# Patient Record
Sex: FEMALE | Race: WHITE | Employment: OTHER | URBAN - METROPOLITAN AREA
[De-identification: names, ages, dates, MRNs, and addresses within clinical notes are randomized per-mention and may not be internally consistent; named-entity substitution may affect disease eponyms.]

---

## 2017-03-30 PROBLEM — Z78.0 MENOPAUSE: Status: ACTIVE | Noted: 2017-03-30

## 2017-07-05 PROCEDURE — 87184 SC STD DISK METHOD PER PLATE: CPT | Performed by: INTERNAL MEDICINE

## 2017-07-05 PROCEDURE — 87086 URINE CULTURE/COLONY COUNT: CPT | Performed by: INTERNAL MEDICINE

## 2017-07-05 PROCEDURE — 87186 SC STD MICRODIL/AGAR DIL: CPT | Performed by: INTERNAL MEDICINE

## 2017-07-05 PROCEDURE — 87077 CULTURE AEROBIC IDENTIFY: CPT | Performed by: INTERNAL MEDICINE

## 2018-07-19 PROBLEM — E66.9 OBESITY (BMI 35.0-39.9 WITHOUT COMORBIDITY): Status: ACTIVE | Noted: 2018-07-19

## 2018-07-19 PROBLEM — Z95.5 PRESENCE OF DRUG COATED STENT IN LEFT CIRCUMFLEX CORONARY ARTERY: Status: ACTIVE | Noted: 2018-07-19

## 2018-07-19 PROBLEM — R00.2 PALPITATIONS: Status: ACTIVE | Noted: 2018-07-19

## 2018-12-12 PROCEDURE — 87086 URINE CULTURE/COLONY COUNT: CPT | Performed by: INTERNAL MEDICINE

## 2018-12-12 PROCEDURE — 87186 SC STD MICRODIL/AGAR DIL: CPT | Performed by: INTERNAL MEDICINE

## 2018-12-12 PROCEDURE — 87088 URINE BACTERIA CULTURE: CPT | Performed by: INTERNAL MEDICINE

## 2020-02-25 PROBLEM — E66.01 MORBID (SEVERE) OBESITY DUE TO EXCESS CALORIES (HCC): Status: ACTIVE | Noted: 2020-02-25

## 2021-04-13 PROBLEM — K51.80 OTHER ULCERATIVE COLITIS WITHOUT COMPLICATION (HCC): Status: ACTIVE | Noted: 2021-04-13

## 2021-05-04 PROCEDURE — 88305 TISSUE EXAM BY PATHOLOGIST: CPT | Performed by: RADIOLOGY

## 2021-05-04 PROCEDURE — 88342 IMHCHEM/IMCYTCHM 1ST ANTB: CPT | Performed by: RADIOLOGY

## 2021-05-04 PROCEDURE — 88360 TUMOR IMMUNOHISTOCHEM/MANUAL: CPT | Performed by: RADIOLOGY

## 2021-05-12 PROBLEM — Z17.0 MALIGNANT NEOPLASM OF LOWER-OUTER QUADRANT OF LEFT BREAST OF FEMALE, ESTROGEN RECEPTOR POSITIVE (HCC): Status: ACTIVE | Noted: 2021-05-12

## 2021-05-12 PROBLEM — Z17.0 MALIGNANT NEOPLASM OF LOWER-OUTER QUADRANT OF LEFT BREAST OF FEMALE, ESTROGEN RECEPTOR POSITIVE: Status: ACTIVE | Noted: 2021-05-12

## 2021-05-12 PROBLEM — C50.512 MALIGNANT NEOPLASM OF LOWER-OUTER QUADRANT OF LEFT BREAST OF FEMALE, ESTROGEN RECEPTOR POSITIVE (HCC): Status: ACTIVE | Noted: 2021-05-12

## 2021-05-12 PROBLEM — C50.512 MALIGNANT NEOPLASM OF LOWER-OUTER QUADRANT OF LEFT BREAST OF FEMALE, ESTROGEN RECEPTOR POSITIVE: Status: ACTIVE | Noted: 2021-05-12

## 2021-06-01 ENCOUNTER — TELEPHONE (OUTPATIENT)
Dept: HEMATOLOGY/ONCOLOGY | Facility: HOSPITAL | Age: 61
End: 2021-06-01

## 2021-06-04 ENCOUNTER — GENETICS ENCOUNTER (OUTPATIENT)
Dept: GENETICS | Facility: HOSPITAL | Age: 61
End: 2021-06-04
Payer: MEDICARE

## 2021-06-04 ENCOUNTER — OFFICE VISIT (OUTPATIENT)
Dept: SURGERY | Facility: CLINIC | Age: 61
End: 2021-06-04
Payer: MEDICARE

## 2021-06-04 ENCOUNTER — NURSE ONLY (OUTPATIENT)
Dept: HEMATOLOGY/ONCOLOGY | Facility: HOSPITAL | Age: 61
End: 2021-06-04
Payer: MEDICARE

## 2021-06-04 VITALS — BODY MASS INDEX: 42.85 KG/M2 | RESPIRATION RATE: 16 BRPM | HEIGHT: 67 IN | WEIGHT: 273 LBS

## 2021-06-04 DIAGNOSIS — Z80.3 FAMILY HISTORY OF BREAST CANCER: ICD-10-CM

## 2021-06-04 DIAGNOSIS — C50.512 MALIGNANT NEOPLASM OF LOWER-OUTER QUADRANT OF LEFT BREAST OF FEMALE, ESTROGEN RECEPTOR POSITIVE (HCC): ICD-10-CM

## 2021-06-04 DIAGNOSIS — E66.01 MORBID (SEVERE) OBESITY DUE TO EXCESS CALORIES (HCC): Primary | ICD-10-CM

## 2021-06-04 DIAGNOSIS — C50.512 MALIGNANT NEOPLASM OF LOWER-OUTER QUADRANT OF LEFT BREAST OF FEMALE, ESTROGEN RECEPTOR POSITIVE (HCC): Primary | ICD-10-CM

## 2021-06-04 DIAGNOSIS — Z17.0 MALIGNANT NEOPLASM OF LOWER-OUTER QUADRANT OF LEFT BREAST OF FEMALE, ESTROGEN RECEPTOR POSITIVE (HCC): ICD-10-CM

## 2021-06-04 DIAGNOSIS — Z17.0 MALIGNANT NEOPLASM OF LOWER-OUTER QUADRANT OF LEFT BREAST OF FEMALE, ESTROGEN RECEPTOR POSITIVE (HCC): Primary | ICD-10-CM

## 2021-06-04 PROCEDURE — 36415 COLL VENOUS BLD VENIPUNCTURE: CPT

## 2021-06-04 PROCEDURE — 99203 OFFICE O/P NEW LOW 30 MIN: CPT | Performed by: SURGERY

## 2021-06-04 PROCEDURE — 3008F BODY MASS INDEX DOCD: CPT | Performed by: SURGERY

## 2021-06-04 PROCEDURE — 96040 HC GENETIC COUNSELING EA 30 MIN: CPT

## 2021-06-04 NOTE — PROGRESS NOTES
Patient Name: Yeison San  YOB: 1960  Date of Visit: 6/4/2021    Reason for visit: Ms. Aminta Bedoya was seen for the purposes of genetic counseling due to her recent diagnosis of breast cancer at age 59 and a family history of breast cancer from dementia with no cancers, one of her nieces  at 40 due to lung cancer with a h/o smoking. Ms. Valentino's maternal grandfather  at 79 due to cardiac issues with no cancers.      Ms. Valentino's father  at 61 due to heart disease with no can hereditary ovarian cancer. Mutations in BRCA1 increase risks for male breast cancer, prostate cancer, and pancreatic as well. This gene is acts as a tumor suppressor gene.  If at least one BRCA1 gene is functioning normally, an individual will be prevente with 90-95% accuracy in identifying a mutation. Other genes that have been identified to be responsible for breast cancer include VELIA, BRIP1, CHEK2, PALB2, PTEN, RAD50, and TP53.  These other genes account for a smaller percentage of hereditary breast cance would return to those expected for individuals in the general population.   It should be emphasized that absence of a mutation in an at-risk individual would not eliminate their risk for cancer, but simply return them to the risk expected for the general po Juan  and family members will depend the above genetic testing results.       Send to: Laz/Mikel/Jax  Time spent with patient: 40 minutes

## 2021-06-04 NOTE — CONSULTS
New Patient Consultation    This is the first visit for this 59year old female who presents to discuss reconstructive options following surgery for breast cancer. History of Present Illness:    The patient is a 59year old female who presents with a lef significant. • CARDIAC CATHETERIZATION  08/30/2005    [address] [place] [address], Dr. Motta Cornelio: Proximal or mid LM 90% stenosis. Mid LAD at diagonal branch a 40% stenosis. RCA with 20- 30 % lesions. EF 50% range.     • CARPAL TUNNEL RELEASE     • CHOLECYSTECTOMY     • Piper Caldwell swollen glands. Respiratory:  The patient denies shortness of breath, cough, bloody cough, phlegm, asthma, or wheezing. Cardiovascular:    The patient denies chest pain/pressure, palpitations, irregular heartbeat, +high blood pressure, stroke, or leg patient is awake, alert, and oriented. She is well-nourished, well-developed woman who appears her stated age. Her speech patterns and movements are normal. Her affect is appropriate. HEENT: The head is normocephalic. The neck is supple.  The thyroid is reconstruction was reviewed with the patient. We discussed the partial submuscular placement of the tissue expander, use of acellular matrix, and placement of drains.   We discussed that use of acellular dermal matrix in  breast reconstruction is considered discussed including the need for activity limitation, drains, and suture removal. We reviewed the impact of post-mastectomy radiation therapy on flap-based reconstruction (should it be deemed necessary based on the final pathology results) including flap f

## 2021-06-08 NOTE — PROGRESS NOTES
This patient is scheduled to have MRI guided breast biopsy on Thursday, Gracie 10. She is not a candidate for reconstruction. She is scheduled to see me Friday, June 25.   If she wants to see me next week either 16 or 18 please schedule 45-minute appointmen

## 2021-06-10 PROCEDURE — 88344 IMHCHEM/IMCYTCHM EA MLT ANTB: CPT | Performed by: RADIOLOGY

## 2021-06-10 PROCEDURE — 88305 TISSUE EXAM BY PATHOLOGIST: CPT | Performed by: RADIOLOGY

## 2021-06-14 ENCOUNTER — GENETICS ENCOUNTER (OUTPATIENT)
Dept: HEMATOLOGY/ONCOLOGY | Facility: HOSPITAL | Age: 61
End: 2021-06-14

## 2021-06-14 NOTE — PROGRESS NOTES
Patient Name: Jamison Lucero  YOB: 1960    Referring Provider:  Chandni Chua MD          Reason for Referral:  Ms. Beverly Donahue had genetic testing performed on 6/4/2021 because of a diagnosis of breast cancer at age 59 and a family hist No changes in management or testing of family members is recommended based on a VUS result. The limitations of the testing were discussed with Ms. Valentino including the chance that a pathogenic variant in a gene other than those included in this analysis

## 2021-06-24 PROCEDURE — 88305 TISSUE EXAM BY PATHOLOGIST: CPT | Performed by: RADIOLOGY

## 2021-06-25 PROBLEM — D05.01 LOBULAR CARCINOMA IN SITU (LCIS) OF RIGHT BREAST: Status: ACTIVE | Noted: 2021-06-25

## 2021-06-25 NOTE — H&P (VIEW-ONLY)
.   Patient presents with: Follow - Up: Follow up LT breast discussion     HPI:    She Hines is a 59year old  female. The patient presents to the office for new diagnosis of  left breast invasive ductal carcinoma 2 sites.    This was recently diag Category 4: Suspicious. Management Recommendation: Biopsy should be performed in the absence of clinical contraindication. The Department of radiology will coordinate scheduling the biopsy with the patient.     Ultrasound-guided core biopsy left breast ma biopsies:  -Invasive ductal carcinoma. -Grade 1 (Tubules: 1, Nuclei: 1, Mitoses: 1). -Largest focus of tumor measures 5 mm (0.5 cm); present in 20% of submitted tissue.        -Associated fibrosis. -Ductal carcinoma in situ (DCIS).         -N -   CB11                0 Negative             6/25/2021: Patient presents for preop discussion of new diagnosis left breast cancer x2.   Patient had MRI guided right breast biopsy which revealed atypical lobular hyperplasia and lobular carcinoma in situ guided needle core biopsy:  -Fragments of breast tissue with atypical lobular hyperplasia (ALH). -Intraductal papilloma (1.5 mm), microcyst formation and stromal fibrosis.     B.   Right breast, 12 o'clock position, MRI guided needle core biopsy:  -Focal a UT) Oral Cap Take 1 tablet by mouth daily. • Zinc 50 MG Oral Cap Take by mouth. • metFORMIN HCl 500 MG Oral Tab Take 1 tablet (500 mg total) by mouth 2 (two) times daily with meals.  60 tablet 5   • gabapentin 300 MG Oral Cap TAKE  1 CAPSULE THREE T 08/30/2005    [facility]: Dr. Susanna Herron: Successful stenting of the circumflex artery with Rx cypher stent.       Family History   Problem Relation Age of Onset   • Heart Disorder Father    • Heart Disorder Brother    • Breast Cancer Cousin 45        mat cousin dx cyanosis or edema  BREAST EXAM:  Axillary Lymph Nodes: No supraclavicular, infraclavicular or axillary adenopathy present  Breast:  LEFT BREAST: 2cm mass at the 5:00 location with a scar present near skin surface, no other masses present.   sebacous cyst of present the patient will require sentinel lymph node biopsy on the right side as well. I had a lengthydiscussion with the patient about breast masses, breast cancer and the need for further work up.   I have recommended that the patient have an open right minutes.   Roland Cruz MD

## 2021-06-29 ENCOUNTER — LAB ENCOUNTER (OUTPATIENT)
Dept: LAB | Age: 61
End: 2021-06-29
Attending: SURGERY
Payer: MEDICARE

## 2021-06-29 DIAGNOSIS — Z01.818 PREOP TESTING: ICD-10-CM

## 2021-06-30 ENCOUNTER — ANESTHESIA EVENT (OUTPATIENT)
Dept: SURGERY | Facility: HOSPITAL | Age: 61
End: 2021-06-30
Payer: MEDICARE

## 2021-07-01 ENCOUNTER — HOSPITAL ENCOUNTER (OUTPATIENT)
Dept: MAMMOGRAPHY | Facility: HOSPITAL | Age: 61
Discharge: HOME | End: 2021-07-01
Attending: SURGERY
Payer: MEDICARE

## 2021-07-01 ENCOUNTER — APPOINTMENT (OUTPATIENT)
Dept: MAMMOGRAPHY | Facility: HOSPITAL | Age: 61
End: 2021-07-01
Attending: SURGERY
Payer: MEDICARE

## 2021-07-01 ENCOUNTER — HOSPITAL ENCOUNTER (OUTPATIENT)
Facility: HOSPITAL | Age: 61
Setting detail: HOSPITAL OUTPATIENT SURGERY
Discharge: HOME | End: 2021-07-01
Attending: SURGERY | Admitting: SURGERY
Payer: MEDICARE

## 2021-07-01 ENCOUNTER — ANESTHESIA (OUTPATIENT)
Dept: SURGERY | Facility: HOSPITAL | Age: 61
End: 2021-07-01
Payer: MEDICARE

## 2021-07-01 VITALS
HEART RATE: 60 BPM | HEIGHT: 67 IN | DIASTOLIC BLOOD PRESSURE: 62 MMHG | BODY MASS INDEX: 42.38 KG/M2 | RESPIRATION RATE: 14 BRPM | WEIGHT: 270 LBS | TEMPERATURE: 98 F | SYSTOLIC BLOOD PRESSURE: 122 MMHG | OXYGEN SATURATION: 98 %

## 2021-07-01 DIAGNOSIS — Z01.818 PREOP TESTING: Primary | ICD-10-CM

## 2021-07-01 DIAGNOSIS — C50.919 BREAST CANCER (HCC): ICD-10-CM

## 2021-07-01 DIAGNOSIS — C50.512 MALIGNANT NEOPLASM OF LOWER-OUTER QUADRANT OF LEFT BREAST OF FEMALE, ESTROGEN RECEPTOR POSITIVE (HCC): ICD-10-CM

## 2021-07-01 DIAGNOSIS — Z17.0 MALIGNANT NEOPLASM OF LOWER-OUTER QUADRANT OF LEFT BREAST OF FEMALE, ESTROGEN RECEPTOR POSITIVE (HCC): ICD-10-CM

## 2021-07-01 DIAGNOSIS — N63.10 BREAST MASS, RIGHT: ICD-10-CM

## 2021-07-01 DIAGNOSIS — N60.91 ATYPICAL LOBULAR HYPERPLASIA OF RIGHT BREAST: ICD-10-CM

## 2021-07-01 LAB — SARS-COV-2 RNA RESP QL NAA+PROBE: NOT DETECTED

## 2021-07-01 PROCEDURE — 0HBT0ZZ EXCISION OF RIGHT BREAST, OPEN APPROACH: ICD-10-PCS | Performed by: SURGERY

## 2021-07-01 PROCEDURE — 76098 X-RAY EXAM SURGICAL SPECIMEN: CPT | Performed by: SURGERY

## 2021-07-01 PROCEDURE — 19282 PERQ DEVICE BREAST EA IMAG: CPT | Performed by: SURGERY

## 2021-07-01 PROCEDURE — 88307 TISSUE EXAM BY PATHOLOGIST: CPT | Performed by: SURGERY

## 2021-07-01 PROCEDURE — 88360 TUMOR IMMUNOHISTOCHEM/MANUAL: CPT | Performed by: SURGERY

## 2021-07-01 PROCEDURE — 19281 PERQ DEVICE BREAST 1ST IMAG: CPT | Performed by: SURGERY

## 2021-07-01 RX ORDER — MIDAZOLAM HYDROCHLORIDE 1 MG/ML
INJECTION INTRAMUSCULAR; INTRAVENOUS AS NEEDED
Status: DISCONTINUED | OUTPATIENT
Start: 2021-07-01 | End: 2021-07-01 | Stop reason: SURG

## 2021-07-01 RX ORDER — HYDROMORPHONE HYDROCHLORIDE 1 MG/ML
0.4 INJECTION, SOLUTION INTRAMUSCULAR; INTRAVENOUS; SUBCUTANEOUS EVERY 5 MIN PRN
Status: DISCONTINUED | OUTPATIENT
Start: 2021-07-01 | End: 2021-07-01

## 2021-07-01 RX ORDER — ONDANSETRON 2 MG/ML
INJECTION INTRAMUSCULAR; INTRAVENOUS AS NEEDED
Status: DISCONTINUED | OUTPATIENT
Start: 2021-07-01 | End: 2021-07-01 | Stop reason: SURG

## 2021-07-01 RX ORDER — LIDOCAINE HYDROCHLORIDE 10 MG/ML
INJECTION, SOLUTION EPIDURAL; INFILTRATION; INTRACAUDAL; PERINEURAL AS NEEDED
Status: DISCONTINUED | OUTPATIENT
Start: 2021-07-01 | End: 2021-07-01 | Stop reason: SURG

## 2021-07-01 RX ORDER — SODIUM CHLORIDE, SODIUM LACTATE, POTASSIUM CHLORIDE, CALCIUM CHLORIDE 600; 310; 30; 20 MG/100ML; MG/100ML; MG/100ML; MG/100ML
INJECTION, SOLUTION INTRAVENOUS CONTINUOUS
Status: DISCONTINUED | OUTPATIENT
Start: 2021-07-01 | End: 2021-07-01

## 2021-07-01 RX ORDER — HYDROMORPHONE HYDROCHLORIDE 1 MG/ML
0.6 INJECTION, SOLUTION INTRAMUSCULAR; INTRAVENOUS; SUBCUTANEOUS EVERY 5 MIN PRN
Status: DISCONTINUED | OUTPATIENT
Start: 2021-07-01 | End: 2021-07-01

## 2021-07-01 RX ORDER — ONDANSETRON 2 MG/ML
4 INJECTION INTRAMUSCULAR; INTRAVENOUS ONCE AS NEEDED
Status: DISCONTINUED | OUTPATIENT
Start: 2021-07-01 | End: 2021-07-01

## 2021-07-01 RX ORDER — TRAMADOL HYDROCHLORIDE 50 MG/1
50 TABLET ORAL EVERY 6 HOURS PRN
Status: CANCELLED | OUTPATIENT
Start: 2021-07-01

## 2021-07-01 RX ORDER — HYDROCODONE BITARTRATE AND ACETAMINOPHEN 5; 325 MG/1; MG/1
1 TABLET ORAL AS NEEDED
Status: DISCONTINUED | OUTPATIENT
Start: 2021-07-01 | End: 2021-07-01

## 2021-07-01 RX ORDER — MORPHINE SULFATE 10 MG/ML
6 INJECTION, SOLUTION INTRAMUSCULAR; INTRAVENOUS EVERY 10 MIN PRN
Status: DISCONTINUED | OUTPATIENT
Start: 2021-07-01 | End: 2021-07-01

## 2021-07-01 RX ORDER — MORPHINE SULFATE 4 MG/ML
2 INJECTION, SOLUTION INTRAMUSCULAR; INTRAVENOUS EVERY 10 MIN PRN
Status: DISCONTINUED | OUTPATIENT
Start: 2021-07-01 | End: 2021-07-01

## 2021-07-01 RX ORDER — PROCHLORPERAZINE EDISYLATE 5 MG/ML
5 INJECTION INTRAMUSCULAR; INTRAVENOUS EVERY 8 HOURS PRN
Status: CANCELLED | OUTPATIENT
Start: 2021-07-01

## 2021-07-01 RX ORDER — NALOXONE HYDROCHLORIDE 0.4 MG/ML
80 INJECTION, SOLUTION INTRAMUSCULAR; INTRAVENOUS; SUBCUTANEOUS AS NEEDED
Status: DISCONTINUED | OUTPATIENT
Start: 2021-07-01 | End: 2021-07-01

## 2021-07-01 RX ORDER — PROCHLORPERAZINE EDISYLATE 5 MG/ML
5 INJECTION INTRAMUSCULAR; INTRAVENOUS ONCE AS NEEDED
Status: DISCONTINUED | OUTPATIENT
Start: 2021-07-01 | End: 2021-07-01

## 2021-07-01 RX ORDER — ACETAMINOPHEN 500 MG
1000 TABLET ORAL ONCE
Status: COMPLETED | OUTPATIENT
Start: 2021-07-01 | End: 2021-07-01

## 2021-07-01 RX ORDER — BUPIVACAINE HYDROCHLORIDE AND EPINEPHRINE 2.5; 5 MG/ML; UG/ML
INJECTION, SOLUTION INFILTRATION; PERINEURAL AS NEEDED
Status: DISCONTINUED | OUTPATIENT
Start: 2021-07-01 | End: 2021-07-01 | Stop reason: HOSPADM

## 2021-07-01 RX ORDER — HALOPERIDOL 5 MG/ML
0.25 INJECTION INTRAMUSCULAR ONCE AS NEEDED
Status: DISCONTINUED | OUTPATIENT
Start: 2021-07-01 | End: 2021-07-01

## 2021-07-01 RX ORDER — HYDROMORPHONE HYDROCHLORIDE 1 MG/ML
0.2 INJECTION, SOLUTION INTRAMUSCULAR; INTRAVENOUS; SUBCUTANEOUS EVERY 5 MIN PRN
Status: DISCONTINUED | OUTPATIENT
Start: 2021-07-01 | End: 2021-07-01

## 2021-07-01 RX ORDER — HYDROCODONE BITARTRATE AND ACETAMINOPHEN 5; 325 MG/1; MG/1
2 TABLET ORAL AS NEEDED
Status: DISCONTINUED | OUTPATIENT
Start: 2021-07-01 | End: 2021-07-01

## 2021-07-01 RX ORDER — ONDANSETRON 2 MG/ML
4 INJECTION INTRAMUSCULAR; INTRAVENOUS EVERY 6 HOURS PRN
Status: CANCELLED | OUTPATIENT
Start: 2021-07-01

## 2021-07-01 RX ORDER — FAMOTIDINE 20 MG/1
20 TABLET ORAL ONCE
Status: COMPLETED | OUTPATIENT
Start: 2021-07-01 | End: 2021-07-01

## 2021-07-01 RX ORDER — METOPROLOL TARTRATE 5 MG/5ML
2.5 INJECTION INTRAVENOUS ONCE
Status: DISCONTINUED | OUTPATIENT
Start: 2021-07-01 | End: 2021-07-01

## 2021-07-01 RX ORDER — DEXAMETHASONE SODIUM PHOSPHATE 4 MG/ML
VIAL (ML) INJECTION AS NEEDED
Status: DISCONTINUED | OUTPATIENT
Start: 2021-07-01 | End: 2021-07-01 | Stop reason: SURG

## 2021-07-01 RX ORDER — MORPHINE SULFATE 4 MG/ML
4 INJECTION, SOLUTION INTRAMUSCULAR; INTRAVENOUS EVERY 10 MIN PRN
Status: DISCONTINUED | OUTPATIENT
Start: 2021-07-01 | End: 2021-07-01

## 2021-07-01 RX ORDER — METOCLOPRAMIDE 10 MG/1
10 TABLET ORAL ONCE
Status: COMPLETED | OUTPATIENT
Start: 2021-07-01 | End: 2021-07-01

## 2021-07-01 RX ADMIN — SODIUM CHLORIDE, SODIUM LACTATE, POTASSIUM CHLORIDE, CALCIUM CHLORIDE: 600; 310; 30; 20 INJECTION, SOLUTION INTRAVENOUS at 12:26:00

## 2021-07-01 RX ADMIN — LIDOCAINE HYDROCHLORIDE 250 MG: 10 INJECTION, SOLUTION EPIDURAL; INFILTRATION; INTRACAUDAL; PERINEURAL at 12:30:00

## 2021-07-01 RX ADMIN — MIDAZOLAM HYDROCHLORIDE 2 MG: 1 INJECTION INTRAMUSCULAR; INTRAVENOUS at 12:30:00

## 2021-07-01 RX ADMIN — ONDANSETRON 4 MG: 2 INJECTION INTRAMUSCULAR; INTRAVENOUS at 13:20:00

## 2021-07-01 RX ADMIN — DEXAMETHASONE SODIUM PHOSPHATE 4 MG: 4 MG/ML VIAL (ML) INJECTION at 13:16:00

## 2021-07-01 RX ADMIN — SODIUM CHLORIDE, SODIUM LACTATE, POTASSIUM CHLORIDE, CALCIUM CHLORIDE: 600; 310; 30; 20 INJECTION, SOLUTION INTRAVENOUS at 13:50:00

## 2021-07-01 NOTE — ANESTHESIA PREPROCEDURE EVALUATION
Anesthesia PreOp Note    HPI:     Yonny Valadez is a 59year old female who presents for preoperative consultation requested by: Iker Luevano MD    Date of Surgery: 7/1/2021    Procedure(s):  Right breast lumpectomy with wire localization at 10 o'c Date Noted: 09/25/2012        Past Medical History:   Diagnosis Date   • Anxiety state    • Atherosclerosis of coronary artery    • Back problem    • Cancer [facility])     right breast cancer   • Colitis    • Esophageal reflux    • Essential hypertension Tab, Take 1 tablet (1 mg total) by mouth 2 (two) times daily as needed. , Disp: 60 tablet, Rfl: 1, 6/30/2021 at 2100  Verapamil HCl  MG Oral Tab CR, Take 1 tablet (240 mg total) by mouth daily. , Disp: 90 tablet, Rfl: 0, 7/1/2021 at 0700  Triamterene-H Highest education level: Not on file    Occupational History      Occupation: retired phelbotomy    Tobacco Use      Smoking status: Former Smoker        Packs/day: 1.00        Years: 12.00        Pack years: 12        Types: Cigarettes        Quit date: 2 05/24/2021    MCH 28.1 05/24/2021    MCHC 32.1 05/24/2021    RDW 13.9 05/24/2021     05/24/2021     Lab Results   Component Value Date     05/24/2021    K 3.57 05/24/2021     05/24/2021    CO2 24.8 05/24/2021    BUN 8.0 05/24/2021    CRE

## 2021-07-01 NOTE — PROCEDURES
[facility] HOSP - [facility]  Procedure Note    Braxton Bermudez Patient Status:  Outpatient    3/18/1960 MRN [id number]   [facility] Bryson Nath MD   Hosp Day # 0 PCP Boston Ash MD     Procedure: [facility] DISPLAY PLAN FREE TEXT

## 2021-07-01 NOTE — BRIEF OP NOTE
Pre-Operative Diagnosis: Atypical lobular hyperplasia of right breast [N60.91]  Malignant neoplasm of lower-outer quadrant of left breast of female, estrogen receptor positive ([facility] [address] [C50.512, Z17.0]     Post-Operative Diagnosis: the same     Procedure Perf

## 2021-07-01 NOTE — ANESTHESIA POSTPROCEDURE EVALUATION
Patient: Sim Floyd    Procedure Summary     Date: 07/01/21 Room / Location: [address] [place] [address] OR 07 / [address] [place] [address] OR    Anesthesia Start: 1226 Anesthesia Stop:     Procedures:       Right breast lumpectomy with wire localization at 6 o'clock position, MRI guided

## 2021-07-01 NOTE — INTERVAL H&P NOTE
Pre-op Diagnosis: Atypical lobular hyperplasia of right breast [N60.91]  Malignant neoplasm of lower-outer quadrant of left breast of female, estrogen receptor positive ([facility] [address] [C50.512, Z17.0]    The above referenced H&P was reviewed by Jose Gamez

## 2021-07-01 NOTE — INTERVAL H&P NOTE
Pre-op Diagnosis: Atypical lobular hyperplasia of right breast [N60.91]  Malignant neoplasm of lower-outer quadrant of left breast of female, estrogen receptor positive ([facility] [address] [C50.512, Z17.0]    The above referenced H&P was reviewed by Andrés Starkey

## 2021-07-01 NOTE — ANESTHESIA PROCEDURE NOTES
Airway  Date/Time: 7/1/2021 12:33 PM  Urgency: Elective      General Information and Staff    Patient location during procedure: OR  Anesthesiologist: Camacho Rodriguez MD  Performed: anesthesiologist     Indications and Patient Condition  Indications fo

## 2021-07-01 NOTE — OPERATIVE REPORT
[facility] POST ANESTHESIA CARE UNIT OPERATIVE REPORT:     PATIENT NAME: Shelia Soares  : 3/18/1960   MRN: [id number]  SITE:   \" , [facility] 67:   2021    PREOPERATIVE DIAGNOSIS: Right breast Right breast lobu present, inability to completely excise the tumor, wound infection, seroma formation, as well as risks with anesthesia including myocardial infarction, respiratory failure, renal failure, pulmonary embolus, DVT and even death were all discussed in detail w to the recovery room extubated and in stable condition. All counts were correct at the end of the case. The role of my assistant was critical in the fact that they acted in retraction, exposure, the breast and axilla, as well as aiding in wound closure.

## 2021-07-01 NOTE — IMAGING NOTE
5383 Pt  to ultrasound [facility] department scouts completed by Counts include 234 beds at the [facility] mammography technologist     2344 Hx taken procedure explained questions answered. Order verified and initialed by all staff members .      0830 Consent signed ON FLOOR and verified

## 2021-07-16 PROBLEM — C50.111 MALIGNANT NEOPLASM OF CENTRAL PORTION OF BOTH BREASTS IN FEMALE, ESTROGEN RECEPTOR POSITIVE (HCC): Status: ACTIVE | Noted: 2021-07-16

## 2021-07-16 PROBLEM — Z17.0 MALIGNANT NEOPLASM OF CENTRAL PORTION OF BOTH BREASTS IN FEMALE, ESTROGEN RECEPTOR POSITIVE (HCC): Status: ACTIVE | Noted: 2021-07-16

## 2021-07-16 PROBLEM — C50.112 MALIGNANT NEOPLASM OF CENTRAL PORTION OF BOTH BREASTS IN FEMALE, ESTROGEN RECEPTOR POSITIVE (HCC): Status: ACTIVE | Noted: 2021-07-16

## 2021-07-16 NOTE — PAT NURSING NOTE
Spoke with Cara Minaya in Dr. Karen Retana office, IV placement should be in the foot on day of surgery, per Dr. Bhavesh Conrad.

## 2021-07-16 NOTE — H&P (VIEW-ONLY)
No chief complaint on file. HPI:    Krzysztof Arroyo is a 59year old  female. The patient presents to the office for new diagnosis of  left breast invasive ductal carcinoma 2 sites. This was recently diagnosed.   Previous breast procedures include Recommendation: Biopsy should be performed in the absence of clinical contraindication. The Department of radiology will coordinate scheduling the biopsy with the patient.     Ultrasound-guided core biopsy left breast mass x2 and ultrasound-guided biopsy l -Grade 1 (Tubules: 1, Nuclei: 1, Mitoses: 1). -Largest focus of tumor measures 5 mm (0.5 cm); present in 20% of submitted tissue.        -Associated fibrosis. -Ductal carcinoma in situ (DCIS). -Nuclear grade 1, cribriform type.     C.   Left 6/25/2021: Patient presents for preop discussion of new diagnosis left breast cancer x2.   Patient had MRI guided right breast biopsy which revealed atypical lobular hyperplasia and lobular carcinoma in situ  Patient is scheduled to undergo right br of breast tissue with atypical lobular hyperplasia (ALH). -Intraductal papilloma (1.5 mm), microcyst formation and stromal fibrosis.     B. Right breast, 12 o'clock position, MRI guided needle core biopsy:  -Focal atypical ductal hyperplasia (ADH).   -Marine Cruz 1mm from the closest inked anterior margin. · Extensive ductal carcinoma in situ, low-grade, cribriform and micropapillary types also involving intraductal papilloma.   · DCIS shows extension to inked lateral margin and seen at a distance of <1 mm from the needed. 60 tablet 1   • Verapamil HCl  MG Oral Tab CR Take 1 tablet (240 mg total) by mouth daily. 90 tablet 0   • Triamterene-HCTZ 37.5-25 MG Oral Tab Take 1 tablet by mouth daily.  90 tablet 0   • METOPROLOL TARTRATE 25 MG Oral Tab TAKE 1 TABLET(25 stenosis. Mid LAD at diagonal branch a 40% stenosis. RCA with 20- 30 % lesions. EF 50% range.     • CARPAL TUNNEL RELEASE     • CATH DRUG ELUTING STENT     • CHOLECYSTECTOMY     • COLONOSCOPY     • EXCISIONAL BIOPSY LEFT  2010    unsure of exact path or manuel excessive thirst or urination; denies unexpected wt gain or wt loss  ALLERGY/IMM.: denies food or seasonal allergies    PHYSICAL EXAM:   General Examination:   NECK/THYROID: neck supple, full range of motion, no cervical lymphadenopathy.    LYMPH NODES: no Mikel who does not feel the patient is a candidate for immediate reconstruction due to her body habitus body weight as well as multiple medical comorbidities. I also discussed the MRI results as well as the right breast MRI guided biopsy in detail.   Zulma localization x2 under general anesthesia at [facility] on Thursday, July 1, 2021.   I have also tentatively scheduled the patient to undergo a left breast total mastectomy with left axillary sentinel lymph node biopsy and right breast total mastectom sentinel lymph node biopsy with frozen section possible axillary lymph node dissection under general anesthetic at [facility] on Thursday, July 22, 2021.   Total time spent in direct patient contact and decision-making  And coordinating the patient's

## 2021-07-20 ENCOUNTER — LAB ENCOUNTER (OUTPATIENT)
Dept: LAB | Age: 61
End: 2021-07-20
Attending: SURGERY
Payer: MEDICARE

## 2021-07-20 DIAGNOSIS — R30.0 DYSURIA: ICD-10-CM

## 2021-07-20 DIAGNOSIS — Z01.818 PRE-OP TESTING: ICD-10-CM

## 2021-07-20 LAB — SP GR UR REFRACTOMETRY: 1.01 (ref 1–1.03)

## 2021-07-20 PROCEDURE — 81001 URINALYSIS AUTO W/SCOPE: CPT

## 2021-07-21 LAB — SARS-COV-2 RNA RESP QL NAA+PROBE: NOT DETECTED

## 2021-07-22 ENCOUNTER — ANESTHESIA (OUTPATIENT)
Dept: SURGERY | Facility: HOSPITAL | Age: 61
End: 2021-07-22
Payer: MEDICARE

## 2021-07-22 ENCOUNTER — ANESTHESIA EVENT (OUTPATIENT)
Dept: SURGERY | Facility: HOSPITAL | Age: 61
End: 2021-07-22
Payer: MEDICARE

## 2021-07-22 ENCOUNTER — HOSPITAL ENCOUNTER (OUTPATIENT)
Facility: HOSPITAL | Age: 61
LOS: 1 days | Discharge: HOME | End: 2021-07-23
Attending: SURGERY | Admitting: HOSPITALIST
Payer: MEDICARE

## 2021-07-22 ENCOUNTER — HOSPITAL ENCOUNTER (OUTPATIENT)
Dept: NUCLEAR MEDICINE | Facility: HOSPITAL | Age: 61
Discharge: HOME | End: 2021-07-22
Attending: SURGERY
Payer: MEDICARE

## 2021-07-22 DIAGNOSIS — C50.919 BREAST CANCER (HCC): ICD-10-CM

## 2021-07-22 DIAGNOSIS — Z01.818 PRE-OP TESTING: Primary | ICD-10-CM

## 2021-07-22 DIAGNOSIS — C50.912 INVASIVE DUCTAL CARCINOMA OF BREAST, FEMALE, LEFT (HCC): ICD-10-CM

## 2021-07-22 PROBLEM — C50.911 BILATERAL BREAST CANCER (HCC): Status: ACTIVE | Noted: 2021-07-22

## 2021-07-22 LAB
BILIRUB UR QL: NEGATIVE
GLUCOSE UR-MCNC: NEGATIVE MG/DL
KETONES UR-MCNC: NEGATIVE MG/DL
PH UR: 7 [PH] (ref 5–8)
PROT UR-MCNC: 100 MG/DL
RBC #/AREA URNS AUTO: >10 /HPF
SP GR UR STRIP: 1.01 (ref 1–1.03)
UROBILINOGEN UR STRIP-ACNC: 4
WBC #/AREA URNS AUTO: >50 /HPF
WBC CLUMPS UR QL AUTO: PRESENT /HPF

## 2021-07-22 PROCEDURE — 87086 URINE CULTURE/COLONY COUNT: CPT | Performed by: SURGERY

## 2021-07-22 PROCEDURE — 0HTT0ZZ RESECTION OF RIGHT BREAST, OPEN APPROACH: ICD-10-PCS | Performed by: SURGERY

## 2021-07-22 PROCEDURE — 88307 TISSUE EXAM BY PATHOLOGIST: CPT | Performed by: SURGERY

## 2021-07-22 PROCEDURE — 0HTU0ZZ RESECTION OF LEFT BREAST, OPEN APPROACH: ICD-10-PCS | Performed by: SURGERY

## 2021-07-22 PROCEDURE — 88331 PATH CONSLTJ SURG 1 BLK 1SPC: CPT | Performed by: SURGERY

## 2021-07-22 PROCEDURE — 78195 LYMPH SYSTEM IMAGING: CPT | Performed by: SURGERY

## 2021-07-22 PROCEDURE — 88333 PATH CONSLTJ SURG CYTO XM 1: CPT | Performed by: SURGERY

## 2021-07-22 PROCEDURE — 88309 TISSUE EXAM BY PATHOLOGIST: CPT | Performed by: SURGERY

## 2021-07-22 PROCEDURE — 88300 SURGICAL PATH GROSS: CPT | Performed by: SURGERY

## 2021-07-22 PROCEDURE — 3E0W3KZ INTRODUCTION OF OTHER DIAGNOSTIC SUBSTANCE INTO LYMPHATICS, PERCUTANEOUS APPROACH: ICD-10-PCS | Performed by: SURGERY

## 2021-07-22 PROCEDURE — 88334 PATH CONSLTJ SURG CYTO XM EA: CPT | Performed by: SURGERY

## 2021-07-22 PROCEDURE — 88342 IMHCHEM/IMCYTCHM 1ST ANTB: CPT | Performed by: SURGERY

## 2021-07-22 PROCEDURE — 07B50ZX EXCISION OF RIGHT AXILLARY LYMPHATIC, OPEN APPROACH, DIAGNOSTIC: ICD-10-PCS | Performed by: SURGERY

## 2021-07-22 PROCEDURE — 88332 PATH CONSLTJ SURG EA ADD BLK: CPT | Performed by: SURGERY

## 2021-07-22 PROCEDURE — 07B60ZX EXCISION OF LEFT AXILLARY LYMPHATIC, OPEN APPROACH, DIAGNOSTIC: ICD-10-PCS | Performed by: SURGERY

## 2021-07-22 PROCEDURE — 81001 URINALYSIS AUTO W/SCOPE: CPT | Performed by: SURGERY

## 2021-07-22 PROCEDURE — 88360 TUMOR IMMUNOHISTOCHEM/MANUAL: CPT | Performed by: SURGERY

## 2021-07-22 RX ORDER — PHENYLEPHRINE HCL 10 MG/ML
VIAL (ML) INJECTION AS NEEDED
Status: DISCONTINUED | OUTPATIENT
Start: 2021-07-22 | End: 2021-07-22 | Stop reason: SURG

## 2021-07-22 RX ORDER — ONDANSETRON 2 MG/ML
INJECTION INTRAMUSCULAR; INTRAVENOUS AS NEEDED
Status: DISCONTINUED | OUTPATIENT
Start: 2021-07-22 | End: 2021-07-22 | Stop reason: SURG

## 2021-07-22 RX ORDER — MIDAZOLAM HYDROCHLORIDE 1 MG/ML
INJECTION INTRAMUSCULAR; INTRAVENOUS AS NEEDED
Status: DISCONTINUED | OUTPATIENT
Start: 2021-07-22 | End: 2021-07-22 | Stop reason: SURG

## 2021-07-22 RX ORDER — PROCHLORPERAZINE EDISYLATE 5 MG/ML
5 INJECTION INTRAMUSCULAR; INTRAVENOUS ONCE AS NEEDED
Status: DISCONTINUED | OUTPATIENT
Start: 2021-07-22 | End: 2021-07-22 | Stop reason: HOSPADM

## 2021-07-22 RX ORDER — HYDROMORPHONE HYDROCHLORIDE 1 MG/ML
0.4 INJECTION, SOLUTION INTRAMUSCULAR; INTRAVENOUS; SUBCUTANEOUS EVERY 5 MIN PRN
Status: DISCONTINUED | OUTPATIENT
Start: 2021-07-22 | End: 2021-07-22 | Stop reason: HOSPADM

## 2021-07-22 RX ORDER — POLYETHYLENE GLYCOL 3350 17 G/17G
17 POWDER, FOR SOLUTION ORAL DAILY PRN
Status: DISCONTINUED | OUTPATIENT
Start: 2021-07-22 | End: 2021-07-23

## 2021-07-22 RX ORDER — HYDROMORPHONE HYDROCHLORIDE 1 MG/ML
0.2 INJECTION, SOLUTION INTRAMUSCULAR; INTRAVENOUS; SUBCUTANEOUS EVERY 5 MIN PRN
Status: DISCONTINUED | OUTPATIENT
Start: 2021-07-22 | End: 2021-07-22 | Stop reason: HOSPADM

## 2021-07-22 RX ORDER — ALPRAZOLAM 0.25 MG/1
1 TABLET ORAL 2 TIMES DAILY PRN
Status: DISCONTINUED | OUTPATIENT
Start: 2021-07-22 | End: 2021-07-23

## 2021-07-22 RX ORDER — NALOXONE HYDROCHLORIDE 0.4 MG/ML
80 INJECTION, SOLUTION INTRAMUSCULAR; INTRAVENOUS; SUBCUTANEOUS AS NEEDED
Status: DISCONTINUED | OUTPATIENT
Start: 2021-07-22 | End: 2021-07-22 | Stop reason: HOSPADM

## 2021-07-22 RX ORDER — DEXAMETHASONE SODIUM PHOSPHATE 4 MG/ML
VIAL (ML) INJECTION AS NEEDED
Status: DISCONTINUED | OUTPATIENT
Start: 2021-07-22 | End: 2021-07-22 | Stop reason: SURG

## 2021-07-22 RX ORDER — HYDROMORPHONE HYDROCHLORIDE 1 MG/ML
0.6 INJECTION, SOLUTION INTRAMUSCULAR; INTRAVENOUS; SUBCUTANEOUS EVERY 5 MIN PRN
Status: DISCONTINUED | OUTPATIENT
Start: 2021-07-22 | End: 2021-07-22 | Stop reason: HOSPADM

## 2021-07-22 RX ORDER — ONDANSETRON 2 MG/ML
8 INJECTION INTRAMUSCULAR; INTRAVENOUS EVERY 6 HOURS PRN
Status: DISCONTINUED | OUTPATIENT
Start: 2021-07-22 | End: 2021-07-23

## 2021-07-22 RX ORDER — METOPROLOL TARTRATE 5 MG/5ML
2.5 INJECTION INTRAVENOUS ONCE
Status: DISCONTINUED | OUTPATIENT
Start: 2021-07-22 | End: 2021-07-22 | Stop reason: HOSPADM

## 2021-07-22 RX ORDER — FAMOTIDINE 20 MG/1
20 TABLET ORAL ONCE
Status: COMPLETED | OUTPATIENT
Start: 2021-07-22 | End: 2021-07-22

## 2021-07-22 RX ORDER — HYDROCODONE BITARTRATE AND ACETAMINOPHEN 5; 325 MG/1; MG/1
2 TABLET ORAL AS NEEDED
Status: DISCONTINUED | OUTPATIENT
Start: 2021-07-22 | End: 2021-07-22 | Stop reason: HOSPADM

## 2021-07-22 RX ORDER — CIPROFLOXACIN 2 MG/ML
400 INJECTION, SOLUTION INTRAVENOUS ONCE
Status: COMPLETED | OUTPATIENT
Start: 2021-07-22 | End: 2021-07-22

## 2021-07-22 RX ORDER — METOCLOPRAMIDE 10 MG/1
10 TABLET ORAL ONCE
Status: COMPLETED | OUTPATIENT
Start: 2021-07-22 | End: 2021-07-22

## 2021-07-22 RX ORDER — BISACODYL 10 MG
10 SUPPOSITORY, RECTAL RECTAL
Status: DISCONTINUED | OUTPATIENT
Start: 2021-07-22 | End: 2021-07-23

## 2021-07-22 RX ORDER — ONDANSETRON 2 MG/ML
4 INJECTION INTRAMUSCULAR; INTRAVENOUS ONCE AS NEEDED
Status: DISCONTINUED | OUTPATIENT
Start: 2021-07-22 | End: 2021-07-22 | Stop reason: HOSPADM

## 2021-07-22 RX ORDER — DOCUSATE SODIUM 100 MG/1
100 CAPSULE, LIQUID FILLED ORAL 2 TIMES DAILY
Status: DISCONTINUED | OUTPATIENT
Start: 2021-07-22 | End: 2021-07-23

## 2021-07-22 RX ORDER — ROSUVASTATIN CALCIUM 10 MG/1
10 TABLET, COATED ORAL DAILY
Status: DISCONTINUED | OUTPATIENT
Start: 2021-07-23 | End: 2021-07-23

## 2021-07-22 RX ORDER — MORPHINE SULFATE 4 MG/ML
4 INJECTION, SOLUTION INTRAMUSCULAR; INTRAVENOUS EVERY 10 MIN PRN
Status: DISCONTINUED | OUTPATIENT
Start: 2021-07-22 | End: 2021-07-22 | Stop reason: HOSPADM

## 2021-07-22 RX ORDER — CEFAZOLIN SODIUM/WATER 2 G/20 ML
2 SYRINGE (ML) INTRAVENOUS ONCE
Status: COMPLETED | OUTPATIENT
Start: 2021-07-22 | End: 2021-07-22

## 2021-07-22 RX ORDER — DEXTROSE, SODIUM CHLORIDE, AND POTASSIUM CHLORIDE 5; .45; .15 G/100ML; G/100ML; G/100ML
INJECTION INTRAVENOUS CONTINUOUS
Status: DISCONTINUED | OUTPATIENT
Start: 2021-07-22 | End: 2021-07-23

## 2021-07-22 RX ORDER — SODIUM CHLORIDE, SODIUM LACTATE, POTASSIUM CHLORIDE, CALCIUM CHLORIDE 600; 310; 30; 20 MG/100ML; MG/100ML; MG/100ML; MG/100ML
INJECTION, SOLUTION INTRAVENOUS CONTINUOUS
Status: DISCONTINUED | OUTPATIENT
Start: 2021-07-22 | End: 2021-07-23

## 2021-07-22 RX ORDER — TRAMADOL HYDROCHLORIDE 50 MG/1
50 TABLET ORAL EVERY 6 HOURS PRN
Status: DISCONTINUED | OUTPATIENT
Start: 2021-07-22 | End: 2021-07-23

## 2021-07-22 RX ORDER — SODIUM CHLORIDE, SODIUM LACTATE, POTASSIUM CHLORIDE, CALCIUM CHLORIDE 600; 310; 30; 20 MG/100ML; MG/100ML; MG/100ML; MG/100ML
INJECTION, SOLUTION INTRAVENOUS CONTINUOUS
Status: DISCONTINUED | OUTPATIENT
Start: 2021-07-22 | End: 2021-07-22 | Stop reason: HOSPADM

## 2021-07-22 RX ORDER — HYDROCODONE BITARTRATE AND ACETAMINOPHEN 5; 325 MG/1; MG/1
1 TABLET ORAL AS NEEDED
Status: DISCONTINUED | OUTPATIENT
Start: 2021-07-22 | End: 2021-07-22 | Stop reason: HOSPADM

## 2021-07-22 RX ORDER — MORPHINE SULFATE 10 MG/ML
6 INJECTION, SOLUTION INTRAMUSCULAR; INTRAVENOUS EVERY 10 MIN PRN
Status: DISCONTINUED | OUTPATIENT
Start: 2021-07-22 | End: 2021-07-22 | Stop reason: HOSPADM

## 2021-07-22 RX ORDER — ACETAMINOPHEN 500 MG
1000 TABLET ORAL ONCE
Status: COMPLETED | OUTPATIENT
Start: 2021-07-22 | End: 2021-07-22

## 2021-07-22 RX ORDER — SODIUM PHOSPHATE, DIBASIC AND SODIUM PHOSPHATE, MONOBASIC 7; 19 G/133ML; G/133ML
1 ENEMA RECTAL ONCE AS NEEDED
Status: DISCONTINUED | OUTPATIENT
Start: 2021-07-22 | End: 2021-07-23

## 2021-07-22 RX ORDER — MORPHINE SULFATE 4 MG/ML
2 INJECTION, SOLUTION INTRAMUSCULAR; INTRAVENOUS EVERY 10 MIN PRN
Status: DISCONTINUED | OUTPATIENT
Start: 2021-07-22 | End: 2021-07-22 | Stop reason: HOSPADM

## 2021-07-22 RX ORDER — ACETAMINOPHEN 500 MG
1000 TABLET ORAL EVERY 8 HOURS SCHEDULED
Status: DISCONTINUED | OUTPATIENT
Start: 2021-07-22 | End: 2021-07-23

## 2021-07-22 RX ORDER — METOCLOPRAMIDE HYDROCHLORIDE 5 MG/ML
10 INJECTION INTRAMUSCULAR; INTRAVENOUS EVERY 6 HOURS PRN
Status: DISCONTINUED | OUTPATIENT
Start: 2021-07-22 | End: 2021-07-23

## 2021-07-22 RX ORDER — EPHEDRINE SULFATE 50 MG/ML
INJECTION, SOLUTION INTRAVENOUS AS NEEDED
Status: DISCONTINUED | OUTPATIENT
Start: 2021-07-22 | End: 2021-07-22 | Stop reason: SURG

## 2021-07-22 RX ADMIN — SODIUM CHLORIDE, SODIUM LACTATE, POTASSIUM CHLORIDE, CALCIUM CHLORIDE: 600; 310; 30; 20 INJECTION, SOLUTION INTRAVENOUS at 11:02:00

## 2021-07-22 RX ADMIN — DEXAMETHASONE SODIUM PHOSPHATE 4 MG: 4 MG/ML VIAL (ML) INJECTION at 11:28:00

## 2021-07-22 RX ADMIN — PHENYLEPHRINE HCL 80 MCG: 10 MG/ML VIAL (ML) INJECTION at 12:47:00

## 2021-07-22 RX ADMIN — ONDANSETRON 4 MG: 2 INJECTION INTRAMUSCULAR; INTRAVENOUS at 11:28:00

## 2021-07-22 RX ADMIN — PHENYLEPHRINE HCL 80 MCG: 10 MG/ML VIAL (ML) INJECTION at 12:59:00

## 2021-07-22 RX ADMIN — CEFAZOLIN SODIUM/WATER 1 G: 2 G/20 ML SYRINGE (ML) INTRAVENOUS at 12:09:00

## 2021-07-22 RX ADMIN — PHENYLEPHRINE HCL 80 MCG: 10 MG/ML VIAL (ML) INJECTION at 13:48:00

## 2021-07-22 RX ADMIN — PHENYLEPHRINE HCL 80 MCG: 10 MG/ML VIAL (ML) INJECTION at 12:25:00

## 2021-07-22 RX ADMIN — SODIUM CHLORIDE, SODIUM LACTATE, POTASSIUM CHLORIDE, CALCIUM CHLORIDE: 600; 310; 30; 20 INJECTION, SOLUTION INTRAVENOUS at 12:48:00

## 2021-07-22 RX ADMIN — EPHEDRINE SULFATE 5 MG: 50 INJECTION, SOLUTION INTRAVENOUS at 11:35:00

## 2021-07-22 RX ADMIN — EPHEDRINE SULFATE 10 MG: 50 INJECTION, SOLUTION INTRAVENOUS at 11:49:00

## 2021-07-22 RX ADMIN — PHENYLEPHRINE HCL 80 MCG: 10 MG/ML VIAL (ML) INJECTION at 12:07:00

## 2021-07-22 RX ADMIN — PHENYLEPHRINE HCL 80 MCG: 10 MG/ML VIAL (ML) INJECTION at 13:20:00

## 2021-07-22 RX ADMIN — MIDAZOLAM HYDROCHLORIDE 2 MG: 1 INJECTION INTRAMUSCULAR; INTRAVENOUS at 11:03:00

## 2021-07-22 RX ADMIN — EPHEDRINE SULFATE 5 MG: 50 INJECTION, SOLUTION INTRAVENOUS at 11:40:00

## 2021-07-22 RX ADMIN — EPHEDRINE SULFATE 5 MG: 50 INJECTION, SOLUTION INTRAVENOUS at 11:38:00

## 2021-07-22 RX ADMIN — PHENYLEPHRINE HCL 40 MCG: 10 MG/ML VIAL (ML) INJECTION at 11:53:00

## 2021-07-22 RX ADMIN — CEFAZOLIN SODIUM/WATER 3 G: 2 G/20 ML SYRINGE (ML) INTRAVENOUS at 15:10:00

## 2021-07-22 RX ADMIN — PHENYLEPHRINE HCL 40 MCG: 10 MG/ML VIAL (ML) INJECTION at 11:56:00

## 2021-07-22 RX ADMIN — EPHEDRINE SULFATE 10 MG: 50 INJECTION, SOLUTION INTRAVENOUS at 11:46:00

## 2021-07-22 RX ADMIN — PHENYLEPHRINE HCL 80 MCG: 10 MG/ML VIAL (ML) INJECTION at 13:38:00

## 2021-07-22 RX ADMIN — EPHEDRINE SULFATE 5 MG: 50 INJECTION, SOLUTION INTRAVENOUS at 11:32:00

## 2021-07-22 RX ADMIN — CIPROFLOXACIN 400 MG: 2 INJECTION, SOLUTION INTRAVENOUS at 11:22:00

## 2021-07-22 RX ADMIN — CEFAZOLIN SODIUM/WATER 2 G: 2 G/20 ML SYRINGE (ML) INTRAVENOUS at 11:11:00

## 2021-07-22 RX ADMIN — EPHEDRINE SULFATE 10 MG: 50 INJECTION, SOLUTION INTRAVENOUS at 11:43:00

## 2021-07-22 RX ADMIN — PHENYLEPHRINE HCL 80 MCG: 10 MG/ML VIAL (ML) INJECTION at 12:45:00

## 2021-07-22 NOTE — BRIEF OP NOTE
Pre-Operative Diagnosis: Bilateral invasive ductal carcinoma   Preoperative clinical stage I    Post-Operative Diagnosis: BILATERAL BREAST CANCER   With axillary lymph node metastasis  Postoperative clinical stage III     Procedure Performed:   left modifi

## 2021-07-22 NOTE — ANESTHESIA POSTPROCEDURE EVALUATION
Patient: Coreen Daniels    Procedure Summary     Date: 07/22/21 Room / Location: [facility] OR  / [facility] MAIN OR    Anesthesia Start: 2267 Anesthesia Stop:     Procedure: left modified radical mastectomy with sentinal lymph node biopsy, LEFT AXILLARY DISSECT

## 2021-07-22 NOTE — INTERVAL H&P NOTE
Pre-op Diagnosis: Invasive ductal carcinoma of breast, female, left ([facility] [address] [C50.912]    The above referenced H&P was reviewed by Martha Espinosa MD on 7/22/2021, the patient was examined and no significant changes have occurred in the patient's condition

## 2021-07-22 NOTE — ANESTHESIA PROCEDURE NOTES
Airway  Date/Time: 7/22/2021 11:15 AM  Urgency: elective    Airway not difficult    General Information and Staff    Patient location during procedure: OR  Anesthesiologist: Corry Gaona MD  Resident/CRNA: Sha Grimes CRNA  Performed: Andrez Matos

## 2021-07-22 NOTE — ANESTHESIA PREPROCEDURE EVALUATION
Anesthesia PreOp Note    HPI:     Shelia Soares is a 59year old female who presents for preoperative consultation requested by: Santino Garcia MD    Date of Surgery: 7/22/2021    Procedure(s):  BILATERAL MASTECTOMIES, [address] Noted: 09/25/2012        Past Medical History:   Diagnosis Date   • Anxiety state    • Atherosclerosis of coronary artery    • Back problem    • Cancer [facility])     right breast cancer and left   • Colitis    • Coronary atherosclerosis    • Esophageal reflux 0300  Ciprofloxacin HCl 500 MG Oral Tab, Take 1 tablet (500 mg total) by mouth 2 (two) times daily for 7 days. , Disp: 14 tablet, Rfl: 0, 7/22/2021 at 0500  ACETAMINOPHEN-CODEINE #3 300-30 MG Oral Tab, TAKE 1 TABLET BY MOUTH DAILY AS NEEDED, Disp: 20 tablet ulcer so cannot take NSAIDs    Family History   Problem Relation Age of Onset   • Heart Disorder Father    • Heart Disorder Brother    • Hypertension Mother    • Other (Other) Mother         fibromyalgia   • Breast Cancer Self      Social History    Socioe Meetings:       Marital Status:   Intimate Partner Violence:       Fear of Current or Ex-Partner:       Emotionally Abused:       Physically Abused:       Sexually Abused:     Available pre-op labs reviewed.   Lab Results   Component Value Date    WBC 5.52 anesthetic plan, benefits, risks including possible dental damage if relevant, major complications, and any alternative forms of anesthetic management. All of the patient's questions were answered to the best of my ability.  The patient desires the anesth

## 2021-07-22 NOTE — H&P
[facility] Hospitalist H&P       CC: No chief complaint on file.        PCP: Anne Marie Armenta MD    Date of Admission: 7/22/2021  7:06 AM    ASSESSMENT / PLAN:     Ms. Yessica Gutierres is a 60 yo F with PMH of HTN, HL, JIMI, breast CA who presented for bilateral total maste 0.  The supine AHI was 61. The non-supine AHI was 7. The patient spent 0% of the baseline portion of the study with an O2 saturation below 88%. O2 Saturation macho was 89%.  CPAP 10  Sleep RX   • Osteoarthritis    • Sleep apnea     CPAP use   • Visual i 1  Verapamil HCl  MG Oral Tab CR, Take 1 tablet (240 mg total) by mouth daily. , Disp: 90 tablet, Rfl: 0  Triamterene-HCTZ 37.5-25 MG Oral Tab, Take 1 tablet by mouth daily. , Disp: 90 tablet, Rfl: 0  METOPROLOL TARTRATE 25 MG Oral Tab, TAKE 1 TABLET(2 edema    Diagnostic Data:    CBC/Chem    No results for input(s): RBC, HGB, HCT, MCV, MCH, MCHC, RDW, NEPRELIM, WBC, PLT in the last 168 hours. Results for Gabriela Delong (MRN [id number]) as of 7/22/2021 16:01   Ref.  Range 5/24/2021 13:36   Glucose La CREATSERUM, GFRAA, GFRNAA, CA, NA, K, CL, CO2 in the last 168 hours. No results for input(s): TROP in the last 168 hours.     Additional Diagnostics:     Radiology: NM SENTINEL NODE/LYMPHATIC  (NAX=[id number])    Result Date: 7/22/2021  CONCLUSION: Success

## 2021-07-23 VITALS
HEART RATE: 82 BPM | BODY MASS INDEX: 42.22 KG/M2 | WEIGHT: 269 LBS | TEMPERATURE: 97 F | SYSTOLIC BLOOD PRESSURE: 146 MMHG | HEIGHT: 67 IN | RESPIRATION RATE: 16 BRPM | OXYGEN SATURATION: 94 % | DIASTOLIC BLOOD PRESSURE: 48 MMHG

## 2021-07-23 LAB
ANION GAP SERPL CALC-SCNC: 6 MMOL/L (ref 0–18)
BASOPHILS # BLD AUTO: 0.01 X10(3) UL (ref 0–0.2)
BASOPHILS NFR BLD AUTO: 0.1 %
BUN BLD-MCNC: 7 MG/DL (ref 7–18)
BUN/CREAT SERPL: 10.8 (ref 10–20)
CALCIUM BLD-MCNC: 8.7 MG/DL (ref 8.5–10.1)
CHLORIDE SERPL-SCNC: 105 MMOL/L (ref 98–112)
CO2 SERPL-SCNC: 28 MMOL/L (ref 21–32)
CREAT BLD-MCNC: 0.65 MG/DL
DEPRECATED RDW RBC AUTO: 46.5 FL (ref 35.1–46.3)
EOSINOPHIL # BLD AUTO: 0 X10(3) UL (ref 0–0.7)
EOSINOPHIL NFR BLD AUTO: 0 %
ERYTHROCYTE [DISTWIDTH] IN BLOOD BY AUTOMATED COUNT: 14.1 % (ref 11–15)
GLUCOSE BLD-MCNC: 180 MG/DL (ref 70–99)
HAV IGM SER QL: 1.7 MG/DL (ref 1.6–2.6)
HCT VFR BLD AUTO: 36.2 %
HGB BLD-MCNC: 11.3 G/DL
IMM GRANULOCYTES # BLD AUTO: 0.02 X10(3) UL (ref 0–1)
IMM GRANULOCYTES NFR BLD: 0.3 %
LYMPHOCYTES # BLD AUTO: 0.52 X10(3) UL (ref 1–4)
LYMPHOCYTES NFR BLD AUTO: 7.6 %
MCH RBC QN AUTO: 28 PG (ref 26–34)
MCHC RBC AUTO-ENTMCNC: 31.2 G/DL (ref 31–37)
MCV RBC AUTO: 89.6 FL
MONOCYTES # BLD AUTO: 0.29 X10(3) UL (ref 0.1–1)
MONOCYTES NFR BLD AUTO: 4.2 %
NEUTROPHILS # BLD AUTO: 6.01 X10 (3) UL (ref 1.5–7.7)
NEUTROPHILS # BLD AUTO: 6.01 X10(3) UL (ref 1.5–7.7)
NEUTROPHILS NFR BLD AUTO: 87.8 %
OSMOLALITY SERPL CALC.SUM OF ELEC: 291 MOSM/KG (ref 275–295)
PHOSPHATE SERPL-MCNC: 2.7 MG/DL (ref 2.5–4.9)
PLATELET # BLD AUTO: 182 10(3)UL (ref 150–450)
POTASSIUM SERPL-SCNC: 3.5 MMOL/L (ref 3.5–5.1)
RBC # BLD AUTO: 4.04 X10(6)UL
SODIUM SERPL-SCNC: 139 MMOL/L (ref 136–145)
WBC # BLD AUTO: 6.9 X10(3) UL (ref 4–11)

## 2021-07-23 PROCEDURE — 80048 BASIC METABOLIC PNL TOTAL CA: CPT | Performed by: SURGERY

## 2021-07-23 PROCEDURE — 84100 ASSAY OF PHOSPHORUS: CPT | Performed by: SURGERY

## 2021-07-23 PROCEDURE — 83735 ASSAY OF MAGNESIUM: CPT | Performed by: SURGERY

## 2021-07-23 PROCEDURE — 85025 COMPLETE CBC W/AUTO DIFF WBC: CPT | Performed by: SURGERY

## 2021-07-23 PROCEDURE — 94660 CPAP INITIATION&MGMT: CPT

## 2021-07-23 RX ORDER — DOCUSATE SODIUM 100 MG/1
100 CAPSULE, LIQUID FILLED ORAL 2 TIMES DAILY
Qty: 14 CAPSULE | Refills: 0 | Status: SHIPPED | OUTPATIENT
Start: 2021-07-23 | End: 2021-07-30

## 2021-07-23 RX ORDER — TRAMADOL HYDROCHLORIDE 50 MG/1
50 TABLET ORAL EVERY 6 HOURS PRN
Qty: 25 TABLET | Refills: 0 | Status: SHIPPED | OUTPATIENT
Start: 2021-07-23 | End: 2021-08-11

## 2021-07-23 NOTE — PROGRESS NOTES
[facility]    Progress Note    Yonnykeo Valadez Patient Status:  Outpatient in a Bed    3/18/1960 MRN [id number]   Location [facility] Attending Nancy Daniels MD   Hosp Day # 1 PCP Roland Cruz MD       Subjective:   Antoine Greer GFRNAA 95   CA 8.7      K 3.5      CO2 28.0             NM SENTINEL NODE/LYMPHATIC  (TOT=[id number])    Result Date: 7/22/2021  CONCLUSION: Successful lymphoscintigraphy procedure.      Dictated by (CST): Petar De La Rosa MD on 7/22/2021 at

## 2021-07-23 NOTE — OPERATIVE REPORT
[facility] [place]    PATIENT'S NAME: Samina Keith   ATTENDING PHYSICIAN: Ananda Peralta.  Kian Sutton MD   OPERATING PHYSICIAN: Keri Alva MD   PATIENT ACCOUNT#:   [de-identified]    LOCATION:  1E ROOM 5 A [facility]  MEDICAL RECORD #:   [id number]       DATE Patient brought to the operating room and placed on the operating table in supine position. General anesthetic was administered via endotracheal tube anesthesia. Patient had lymphoscintigraphy preoperatively performed by Nuclear Medicine.   The patient ha The breast specimen was removed off the field. Frozen section revealed sentinel lymph node 2, which included 2 lymph nodes, to have positive metastasis extensively throughout the lymph nodes.   Given the above, it was decided that an axillary lymph node di device. This was blue. The 10-second ex vivo count was 6844. The 10-second basin count was 86, and there were no other lymph nodes palpable within the axilla.   Given the fact that the patient had positive lymph nodes on the left side, frozen section was

## 2021-07-23 NOTE — DISCHARGE SUMMARY
General Medicine Discharge Summary     Patient ID:  Mary Germain  59year old  3/18/1960    Admit date: 7/22/2021    Discharge date and time: 07/23/21    Attending Physician: No att. providers found     Primary Care Physician: Urbano Luna MD     Reason lymphoscintigraphy procedure.      Dictated by (CST): Joe Love MD on 7/22/2021 at 10:39 AM     Finalized by (CST): Joe Love MD on 7/22/2021 at 10:42 AM                Home Medication Changes:     I reconciled current and discha prescription for each of these medications  · docusate sodium 100 MG Caps  · traMADol HCl 50 MG Tabs         Activity: as instructed  Diet: regular diet  Wound Care: keep wound clean and dry  Code Status: No Order  O2: n/a    Follow-up with:    PCP   Speci do NOT take Tylenol #3. Okay to use regular Tylenol only (as directed above) with Tramadol.     Do not take Tramadol around the time you take any other medications that make you drowsy (such as your Xanax or Flexaril), use Tylenol (if time for it) and ice a on the brake pedal without hesitation. · Ask others to help with chores and errands while you recover. · Don’t lift anything heavier than 10 pounds until your doctor says it’s OK.   · Don’t mow the lawn, shovel snow, use a vacuum , or do other stre

## 2021-07-29 NOTE — PAYOR COMM NOTE
--------------  DISCHARGE REVIEW    Payor: Raymond Kesslermenez #:  [id number]  Authorization Number: [id number]    Admit date: 7/22/21  Admit time:   7:06 AM  Discharge Date: 7/23/2021 12:57 PM     Admitting Physician: Jean Pierre Loo MD  Primary Care P sensory intact  Psych: Affect- normal  SKIN: warm, dry  EXT: no edema    Operative Procedures: Procedure(s) (LRB):  left modified radical mastectomy with sentinal lymph node biopsy, LEFT AXILLARY DISSECTION, RIGHT TOTAL MASTECTOMY WITH SENTINAL LYMPH NODE total) by mouth daily.      Vitamin D3 25 MCG (1000 UT) Caps     Zinc 50 MG Caps           Where to Get Your Medications      You can get these medications from any pharmacy    Bring a paper prescription for each of these medications  · docusate sodium 100 (Colace) is helpful to avoid constipation. Take 1 orally twice a day. If  no bowel movement within 48 hours, MOM 30 mls may be helpful. If you are taking Tramadol for pain, do NOT take Tylenol #3.  Okay to use regular Tylenol only (as directed above) wit make any major decisions for at least the first 24 hours. · Don’t drive while you are still taking opioid pain medication, and don’t drive u.ntil you are able to step firmly on the brake pedal without hesitation.   · Ask others to help with chores and erra

## 2021-07-29 NOTE — PAYOR COMM NOTE
--------------  ADMISSION REVIEW     Payor: Rupa Saxena #:  [id number]  Authorization Number: [id number]    Admit date: 7/22/21  Admit time:  7:06 AM     Admitting Physician: Seng Oakes MD  Primary Care Physician: Matthew Hathaway MD    REVIEW EOMI   Pulm: CTAB, no crackles or wheezes  CV: RRR, no murmurs   ABD: Soft, non-tender, non-distended, +BS  Neuro: Grossly normal, CN intact, sensory intact  Psych: Affect- normal  SKIN: warm, dry  EXT: no edema    Diagnostic Data:    CBC/Chem  as of 7/22/ NM SENTINEL NODE/LYMPHATIC   Result Date: 7/22/2021  CONCLUSION: Successful lymphoscintigraphy procedure. Ashok Franco MD  7/22/2021  5:04 PM    OPERATIVE REPORT  PREOPERATIVE DIAGNOSIS:  Bilateral invasive ductal carcinoma.   POSTOPERATIVE FRANK preoperatively performed by Nuclear Medicine. The patient had 2.5 mL of Lymphazurin blue injected in the subareolar region bilaterally. The breasts were massaged for approximately 15 minutes.   Next, bilateral breasts, axillae, and arms were prepped and d Given the above, it was decided that an axillary lymph node dissection would be performed. The axilla was identified, and the axillary vein was visualized.   The level 1 and level 2 lymph nodes were swept free, and using the LigaSure device, this was freed patient had positive lymph nodes on the left side, frozen section was not to be completed. The wounds were irrigated thoroughly with saline solution. There was no active bleeding present.   Two 10 mm fully perforated Elkin-Rodríguez drains were placed under Ciprofloxacin in D5W (CIPRO) IVPB premix SOLN 400 mg   Dose: 400 mg  Freq: Once Route: IV  Start: 07/22/21 0900 End: 07/22/21 1122    Order specific questions:   What infection is this being used to treat?  Stat/one time dose    1122-Given                 d (07/23/21 0801)  Start: 07/22/21 0715 End: 07/23/21 1457    Admin Instructions:   TKO  If patient has renal failure, use 0.9% NaCl infusion.     0743-New Bag   1101-Paused [C]   1102-Restarted   1248-New Bag        0801-Stopped   1457-D/C'd

## 2021-09-01 PROBLEM — Z45.2 EXHAUSTED VASCULAR ACCESS: Status: ACTIVE | Noted: 2021-09-01

## 2021-09-07 ENCOUNTER — LAB ENCOUNTER (OUTPATIENT)
Dept: LAB | Age: 61
End: 2021-09-07
Attending: SURGERY
Payer: MEDICARE

## 2021-09-07 DIAGNOSIS — Z01.818 PREOPERATIVE TESTING: ICD-10-CM

## 2021-09-08 LAB — SARS-COV-2 RNA RESP QL NAA+PROBE: NOT DETECTED

## 2021-09-09 ENCOUNTER — APPOINTMENT (OUTPATIENT)
Dept: GENERAL RADIOLOGY | Facility: HOSPITAL | Age: 61
End: 2021-09-09
Attending: SURGERY
Payer: MEDICARE

## 2021-09-09 ENCOUNTER — ANESTHESIA EVENT (OUTPATIENT)
Dept: SURGERY | Facility: HOSPITAL | Age: 61
End: 2021-09-09
Payer: MEDICARE

## 2021-09-09 ENCOUNTER — HOSPITAL ENCOUNTER (OUTPATIENT)
Facility: HOSPITAL | Age: 61
Setting detail: HOSPITAL OUTPATIENT SURGERY
Discharge: HOME | End: 2021-09-09
Attending: SURGERY | Admitting: SURGERY
Payer: MEDICARE

## 2021-09-09 ENCOUNTER — ANESTHESIA (OUTPATIENT)
Dept: SURGERY | Facility: HOSPITAL | Age: 61
End: 2021-09-09
Payer: MEDICARE

## 2021-09-09 VITALS
TEMPERATURE: 97 F | SYSTOLIC BLOOD PRESSURE: 130 MMHG | HEART RATE: 46 BPM | RESPIRATION RATE: 18 BRPM | WEIGHT: 267.13 LBS | BODY MASS INDEX: 41.93 KG/M2 | OXYGEN SATURATION: 98 % | DIASTOLIC BLOOD PRESSURE: 65 MMHG | HEIGHT: 67 IN

## 2021-09-09 DIAGNOSIS — C50.111 MALIGNANT NEOPLASM OF CENTRAL PORTION OF BOTH BREASTS IN FEMALE, ESTROGEN RECEPTOR POSITIVE (HCC): ICD-10-CM

## 2021-09-09 DIAGNOSIS — C50.112 MALIGNANT NEOPLASM OF CENTRAL PORTION OF BOTH BREASTS IN FEMALE, ESTROGEN RECEPTOR POSITIVE (HCC): ICD-10-CM

## 2021-09-09 DIAGNOSIS — Z17.0 MALIGNANT NEOPLASM OF CENTRAL PORTION OF BOTH BREASTS IN FEMALE, ESTROGEN RECEPTOR POSITIVE (HCC): ICD-10-CM

## 2021-09-09 DIAGNOSIS — Z17.0 MALIGNANT NEOPLASM OF LOWER-OUTER QUADRANT OF LEFT BREAST OF FEMALE, ESTROGEN RECEPTOR POSITIVE (HCC): ICD-10-CM

## 2021-09-09 DIAGNOSIS — C50.512 MALIGNANT NEOPLASM OF LOWER-OUTER QUADRANT OF LEFT BREAST OF FEMALE, ESTROGEN RECEPTOR POSITIVE (HCC): ICD-10-CM

## 2021-09-09 DIAGNOSIS — Z01.818 PREOPERATIVE TESTING: Primary | ICD-10-CM

## 2021-09-09 PROCEDURE — 76000 FLUOROSCOPY <1 HR PHYS/QHP: CPT | Performed by: SURGERY

## 2021-09-09 PROCEDURE — 02HV33Z INSERTION OF INFUSION DEVICE INTO SUPERIOR VENA CAVA, PERCUTANEOUS APPROACH: ICD-10-PCS | Performed by: SURGERY

## 2021-09-09 PROCEDURE — 71045 X-RAY EXAM CHEST 1 VIEW: CPT | Performed by: SURGERY

## 2021-09-09 DEVICE — 8F PLASTIC DIGNITY® MID-SIZED CT PORT W/ATTACHABLE CHRONOFLEX® POLYURETHANE CATHETER
Type: IMPLANTABLE DEVICE | Status: FUNCTIONAL
Brand: DIGNITY® MID-SIZED CT PORT

## 2021-09-09 RX ORDER — MORPHINE SULFATE 4 MG/ML
2 INJECTION, SOLUTION INTRAMUSCULAR; INTRAVENOUS EVERY 10 MIN PRN
Status: DISCONTINUED | OUTPATIENT
Start: 2021-09-09 | End: 2021-09-09

## 2021-09-09 RX ORDER — NALOXONE HYDROCHLORIDE 0.4 MG/ML
80 INJECTION, SOLUTION INTRAMUSCULAR; INTRAVENOUS; SUBCUTANEOUS AS NEEDED
Status: DISCONTINUED | OUTPATIENT
Start: 2021-09-09 | End: 2021-09-09

## 2021-09-09 RX ORDER — HALOPERIDOL 5 MG/ML
0.25 INJECTION INTRAMUSCULAR ONCE AS NEEDED
Status: DISCONTINUED | OUTPATIENT
Start: 2021-09-09 | End: 2021-09-09

## 2021-09-09 RX ORDER — HYDROMORPHONE HYDROCHLORIDE 1 MG/ML
0.4 INJECTION, SOLUTION INTRAMUSCULAR; INTRAVENOUS; SUBCUTANEOUS EVERY 5 MIN PRN
Status: DISCONTINUED | OUTPATIENT
Start: 2021-09-09 | End: 2021-09-09

## 2021-09-09 RX ORDER — SODIUM CHLORIDE, SODIUM LACTATE, POTASSIUM CHLORIDE, CALCIUM CHLORIDE 600; 310; 30; 20 MG/100ML; MG/100ML; MG/100ML; MG/100ML
INJECTION, SOLUTION INTRAVENOUS CONTINUOUS
Status: DISCONTINUED | OUTPATIENT
Start: 2021-09-09 | End: 2021-09-09

## 2021-09-09 RX ORDER — MORPHINE SULFATE 4 MG/ML
4 INJECTION, SOLUTION INTRAMUSCULAR; INTRAVENOUS EVERY 10 MIN PRN
Status: DISCONTINUED | OUTPATIENT
Start: 2021-09-09 | End: 2021-09-09

## 2021-09-09 RX ORDER — ONDANSETRON 2 MG/ML
INJECTION INTRAMUSCULAR; INTRAVENOUS AS NEEDED
Status: DISCONTINUED | OUTPATIENT
Start: 2021-09-09 | End: 2021-09-09 | Stop reason: SURG

## 2021-09-09 RX ORDER — DEXAMETHASONE SODIUM PHOSPHATE 4 MG/ML
VIAL (ML) INJECTION AS NEEDED
Status: DISCONTINUED | OUTPATIENT
Start: 2021-09-09 | End: 2021-09-09 | Stop reason: SURG

## 2021-09-09 RX ORDER — MORPHINE SULFATE 10 MG/ML
6 INJECTION, SOLUTION INTRAMUSCULAR; INTRAVENOUS EVERY 10 MIN PRN
Status: DISCONTINUED | OUTPATIENT
Start: 2021-09-09 | End: 2021-09-09

## 2021-09-09 RX ORDER — METOPROLOL TARTRATE 5 MG/5ML
2.5 INJECTION INTRAVENOUS ONCE
Status: DISCONTINUED | OUTPATIENT
Start: 2021-09-09 | End: 2021-09-09

## 2021-09-09 RX ORDER — TRAMADOL HYDROCHLORIDE 50 MG/1
50 TABLET ORAL EVERY 6 HOURS PRN
Qty: 10 TABLET | Refills: 0 | Status: SHIPPED | OUTPATIENT
Start: 2021-09-09 | End: 2021-09-17 | Stop reason: ALTCHOICE

## 2021-09-09 RX ORDER — BUPIVACAINE HYDROCHLORIDE AND EPINEPHRINE 2.5; 5 MG/ML; UG/ML
INJECTION, SOLUTION INFILTRATION; PERINEURAL AS NEEDED
Status: DISCONTINUED | OUTPATIENT
Start: 2021-09-09 | End: 2021-09-09 | Stop reason: HOSPADM

## 2021-09-09 RX ORDER — LIDOCAINE HYDROCHLORIDE 10 MG/ML
INJECTION, SOLUTION EPIDURAL; INFILTRATION; INTRACAUDAL; PERINEURAL AS NEEDED
Status: DISCONTINUED | OUTPATIENT
Start: 2021-09-09 | End: 2021-09-09 | Stop reason: SURG

## 2021-09-09 RX ORDER — ONDANSETRON 2 MG/ML
4 INJECTION INTRAMUSCULAR; INTRAVENOUS ONCE AS NEEDED
Status: DISCONTINUED | OUTPATIENT
Start: 2021-09-09 | End: 2021-09-09

## 2021-09-09 RX ORDER — HYDROCODONE BITARTRATE AND ACETAMINOPHEN 5; 325 MG/1; MG/1
1 TABLET ORAL AS NEEDED
Status: DISCONTINUED | OUTPATIENT
Start: 2021-09-09 | End: 2021-09-09

## 2021-09-09 RX ORDER — HYDROMORPHONE HYDROCHLORIDE 1 MG/ML
0.6 INJECTION, SOLUTION INTRAMUSCULAR; INTRAVENOUS; SUBCUTANEOUS EVERY 5 MIN PRN
Status: DISCONTINUED | OUTPATIENT
Start: 2021-09-09 | End: 2021-09-09

## 2021-09-09 RX ORDER — FAMOTIDINE 20 MG/1
20 TABLET ORAL ONCE
Status: COMPLETED | OUTPATIENT
Start: 2021-09-09 | End: 2021-09-09

## 2021-09-09 RX ORDER — EPHEDRINE SULFATE 50 MG/ML
INJECTION INTRAVENOUS AS NEEDED
Status: DISCONTINUED | OUTPATIENT
Start: 2021-09-09 | End: 2021-09-09 | Stop reason: SURG

## 2021-09-09 RX ORDER — HYDROCODONE BITARTRATE AND ACETAMINOPHEN 5; 325 MG/1; MG/1
2 TABLET ORAL AS NEEDED
Status: DISCONTINUED | OUTPATIENT
Start: 2021-09-09 | End: 2021-09-09

## 2021-09-09 RX ORDER — ACETAMINOPHEN 500 MG
1000 TABLET ORAL ONCE
Status: COMPLETED | OUTPATIENT
Start: 2021-09-09 | End: 2021-09-09

## 2021-09-09 RX ORDER — PHENYLEPHRINE HCL 10 MG/ML
VIAL (ML) INJECTION AS NEEDED
Status: DISCONTINUED | OUTPATIENT
Start: 2021-09-09 | End: 2021-09-09 | Stop reason: SURG

## 2021-09-09 RX ORDER — MIDAZOLAM HYDROCHLORIDE 1 MG/ML
INJECTION INTRAMUSCULAR; INTRAVENOUS AS NEEDED
Status: DISCONTINUED | OUTPATIENT
Start: 2021-09-09 | End: 2021-09-09 | Stop reason: SURG

## 2021-09-09 RX ORDER — METOCLOPRAMIDE 10 MG/1
10 TABLET ORAL ONCE
Status: COMPLETED | OUTPATIENT
Start: 2021-09-09 | End: 2021-09-09

## 2021-09-09 RX ORDER — HYDROMORPHONE HYDROCHLORIDE 1 MG/ML
0.2 INJECTION, SOLUTION INTRAMUSCULAR; INTRAVENOUS; SUBCUTANEOUS EVERY 5 MIN PRN
Status: DISCONTINUED | OUTPATIENT
Start: 2021-09-09 | End: 2021-09-09

## 2021-09-09 RX ORDER — PROCHLORPERAZINE EDISYLATE 5 MG/ML
5 INJECTION INTRAMUSCULAR; INTRAVENOUS ONCE AS NEEDED
Status: DISCONTINUED | OUTPATIENT
Start: 2021-09-09 | End: 2021-09-09

## 2021-09-09 RX ADMIN — EPHEDRINE SULFATE 10 MG: 50 INJECTION INTRAVENOUS at 08:01:00

## 2021-09-09 RX ADMIN — LIDOCAINE HYDROCHLORIDE 30 MG: 10 INJECTION, SOLUTION EPIDURAL; INFILTRATION; INTRACAUDAL; PERINEURAL at 07:37:00

## 2021-09-09 RX ADMIN — EPHEDRINE SULFATE 10 MG: 50 INJECTION INTRAVENOUS at 08:11:00

## 2021-09-09 RX ADMIN — PHENYLEPHRINE HCL 50 MCG: 10 MG/ML VIAL (ML) INJECTION at 08:18:00

## 2021-09-09 RX ADMIN — ONDANSETRON 4 MG: 2 INJECTION INTRAMUSCULAR; INTRAVENOUS at 07:44:00

## 2021-09-09 RX ADMIN — SODIUM CHLORIDE, SODIUM LACTATE, POTASSIUM CHLORIDE, CALCIUM CHLORIDE: 600; 310; 30; 20 INJECTION, SOLUTION INTRAVENOUS at 08:43:00

## 2021-09-09 RX ADMIN — MIDAZOLAM HYDROCHLORIDE 2 MG: 1 INJECTION INTRAMUSCULAR; INTRAVENOUS at 07:32:00

## 2021-09-09 RX ADMIN — DEXAMETHASONE SODIUM PHOSPHATE 4 MG: 4 MG/ML VIAL (ML) INJECTION at 07:44:00

## 2021-09-09 RX ADMIN — EPHEDRINE SULFATE 10 MG: 50 INJECTION INTRAVENOUS at 07:55:00

## 2021-09-09 RX ADMIN — EPHEDRINE SULFATE 10 MG: 50 INJECTION INTRAVENOUS at 08:32:00

## 2021-09-09 RX ADMIN — EPHEDRINE SULFATE 10 MG: 50 INJECTION INTRAVENOUS at 08:18:00

## 2021-09-09 NOTE — ANESTHESIA PROCEDURE NOTES
Airway  Date/Time: 9/9/2021 7:37 AM  Urgency: elective    Airway not difficult    General Information and Staff    Patient location during procedure: OR  Anesthesiologist: Neli Ramos MD  Resident/CRNA: Flori Ugarte CRNA  Performed: CRNA     Indic

## 2021-09-09 NOTE — BRIEF OP NOTE
Pre-Operative Diagnosis: Malignant neoplasm of lower-outer quadrant of left breast of female, estrogen receptor positive ([address] [C50.512, Z17.0]  Malignant neoplasm of central portion of both breasts in female, estrogen receptor positive ([address] [C50.111, Z17

## 2021-09-09 NOTE — H&P (VIEW-ONLY)
HPI:    Jaret Weber is a 59year old  female. The patient presents to the office for new diagnosis of  left breast invasive ductal carcinoma 2 sites. This was recently diagnosed.   Previous breast procedures include left breast biopsy.         Ernesto in the absence of clinical contraindication.   The Department of radiology will coordinate scheduling the biopsy with the patient.     Ultrasound-guided core biopsy left breast mass x2 and ultrasound-guided biopsy left axillary lymph node 5/4/2021:  IMPRESS Mitoses: 1).      -Largest focus of tumor measures 5 mm (0.5 cm); present in 20% of submitted tissue.        -Associated fibrosis. -Ductal carcinoma in situ (DCIS).         -Nuclear grade 1, cribriform type.     C.  Left axillary lymph node, ultrasound-g presents for preop discussion of new diagnosis left breast cancer x2.   Patient had MRI guided right breast biopsy which revealed atypical lobular hyperplasia and lobular carcinoma in situ  Patient is scheduled to undergo right breast biopsy wire localizati atypical lobular hyperplasia ([place]). -Intraductal papilloma (1.5 mm), microcyst formation and stromal fibrosis.     B.  Right breast, 12 o'clock position, MRI guided needle core biopsy:  -Focal atypical ductal hyperplasia (ADH).   -Foci of atypical lobular the closest inked anterior margin. · Extensive ductal carcinoma in situ, low-grade, cribriform and micropapillary types also involving intraductal papilloma.   · DCIS shows extension to inked lateral margin and seen at a distance of <1 mm from the inked an breast pathology reveals T1 aN0 M0 stage I 0 out of 2 lymph node positive  Patient with complaints of none  The patient is scheduled to  see medical oncology radiation oncology        8/20/2021:Patient presents status post left modified radical mastectomy  None.     Final Diagnosis:      A. Left axilla sentinel lymph node #1:  · One lymph node with metastatic carcinoma (1/1). · Largest tumor deposit measures 0.2 cm. · Extracapsular extension is not identified.     B.  Left axillary sentinel lymph nodes #2- Patient has history of breast feeding none. Menopause at age 46. Hormone replacement history: prempro and then estrogen 13 years. Oral contraceptive pills taken for 25 years.  Family history of breast or ovarian cancer: none  But the patient's maternal 1st Atherosclerosis of coronary artery     • Back problem     • Cancer [facility])       right breast cancer and left   • Colitis     • Coronary atherosclerosis     • Esophageal reflux     • Essential hypertension     • High blood pressure     • High cholesterol   Heart Disorder Brother     • Hypertension Mother     • Other (Other) Mother           fibromyalgia   • Breast Cancer Self         Social History    Tobacco Use      Smoking status: Former Smoker        Packs/day: 1.00        Years: 12.00        Pack years: present near skin surface, no other masses present. sebacous cyst of the left breast 7:00    RIGHT BREAST: no palpable masses present. Nipple: DISCHARGE: None. RETRACTION: None. SKIN CHANGES: None. 7/16/2021: Right breast wound healing well.   Steri-S not want vaccination due to concerns for safety.   I did discuss with the patient is her choice to make sure she takes adequate precautions during treatments such as social distancing masking avoiding kissing hugging people shaking hands with people in seek

## 2021-09-09 NOTE — INTERVAL H&P NOTE
Pre-op Diagnosis: Malignant neoplasm of lower-outer quadrant of left breast of female, estrogen receptor positive ([address] [C50.512, Z17.0]  Malignant neoplasm of central portion of both breasts in female, estrogen receptor positive ([address] [C50.111, Z17.0, C50

## 2021-09-09 NOTE — OPERATIVE REPORT
[facility] OPERATING ROOM OPERATIVE REPORT:     PATIENT NAME: Tricia Garza  : 3/18/1960   MRN: [id number]  SITE: [place]    DATE OF OPERATION:   21    PREOPERATIVE DIAGNOSIS:   Breast Cancer, Need for IV access for chemotherapy      POS centrally until the tip was in the superior vena cava right atrial junction. This was confirmed under fluoroscopic supervision. The catheter was placed centrally.  The catheter was trimmed to the appropriate length and attached to the reservoir with a locki

## 2021-09-09 NOTE — CONSULTS
HPI:    Jamison Lucero is a 59year old  female. The patient presents to the office for new diagnosis of  left breast invasive ductal carcinoma 2 sites. This was recently diagnosed.   Previous breast procedures include left breast biopsy.         Ernesto in the absence of clinical contraindication.   The Department of radiology will coordinate scheduling the biopsy with the patient.     Ultrasound-guided core biopsy left breast mass x2 and ultrasound-guided biopsy left axillary lymph node 5/4/2021:  IMPRESS Mitoses: 1).      -Largest focus of tumor measures 5 mm (0.5 cm); present in 20% of submitted tissue.        -Associated fibrosis. -Ductal carcinoma in situ (DCIS).         -Nuclear grade 1, cribriform type.     C.  Left axillary lymph node, ultrasound-g presents for preop discussion of new diagnosis left breast cancer x2.   Patient had MRI guided right breast biopsy which revealed atypical lobular hyperplasia and lobular carcinoma in situ  Patient is scheduled to undergo right breast biopsy wire localizati atypical lobular hyperplasia ([place]). -Intraductal papilloma (1.5 mm), microcyst formation and stromal fibrosis.     B.  Right breast, 12 o'clock position, MRI guided needle core biopsy:  -Focal atypical ductal hyperplasia (ADH).   -Foci of atypical lobular the closest inked anterior margin. · Extensive ductal carcinoma in situ, low-grade, cribriform and micropapillary types also involving intraductal papilloma.   · DCIS shows extension to inked lateral margin and seen at a distance of <1 mm from the inked an breast pathology reveals T1 aN0 M0 stage I 0 out of 2 lymph node positive  Patient with complaints of none  The patient is scheduled to  see medical oncology radiation oncology        8/20/2021:Patient presents status post left modified radical mastectomy  None.     Final Diagnosis:      A. Left axilla sentinel lymph node #1:  · One lymph node with metastatic carcinoma (1/1). · Largest tumor deposit measures 0.2 cm. · Extracapsular extension is not identified.     B.  Left axillary sentinel lymph nodes #2- Patient has history of breast feeding none. Menopause at age 46. Hormone replacement history: prempro and then estrogen 13 years. Oral contraceptive pills taken for 25 years.  Family history of breast or ovarian cancer: none  But the patient's maternal 1st Atherosclerosis of coronary artery     • Back problem     • Cancer [facility])       right breast cancer and left   • Colitis     • Coronary atherosclerosis     • Esophageal reflux     • Essential hypertension     • High blood pressure     • High cholesterol   Heart Disorder Brother     • Hypertension Mother     • Other (Other) Mother           fibromyalgia   • Breast Cancer Self         Social History    Tobacco Use      Smoking status: Former Smoker        Packs/day: 1.00        Years: 12.00        Pack years: present near skin surface, no other masses present. sebacous cyst of the left breast 7:00    RIGHT BREAST: no palpable masses present. Nipple: DISCHARGE: None. RETRACTION: None. SKIN CHANGES: None. 7/16/2021: Right breast wound healing well.   Steri-S not want vaccination due to concerns for safety.   I did discuss with the patient is her choice to make sure she takes adequate precautions during treatments such as social distancing masking avoiding kissing hugging people shaking hands with people in seek

## 2021-09-09 NOTE — ANESTHESIA POSTPROCEDURE EVALUATION
Patient: Cristofer Stephenson    Procedure Summary     Date: 09/09/21 Room / Location: [facility] OR  / [facility] OR    Anesthesia Start: 0732 Anesthesia Stop: 6920    Procedure: INSERTION OF SUBCUTANEOUS PORT-A-CATHETER WITH RESERVOIR RIGHT CEPHALIC VEIN (Right

## 2021-09-09 NOTE — ANESTHESIA PREPROCEDURE EVALUATION
Anesthesia PreOp Note    HPI:     Quinton Daniels is a 59year old female who presents for preoperative consultation requested by: Yohana Bunn MD    Date of Surgery: 9/9/2021    Procedure(s):  INSERTION OF SUBCUTANEOUS PORT-A-CATHETER WITH RESERVOI 09/25/2012      Primary hypertriglyceridemia         Date Noted: 09/25/2012        Past Medical History:   Diagnosis Date   • Anxiety state    • Atherosclerosis of coronary artery    • Back problem    • Cancer ([address]     right breast cancer and left   • Col stenting of the circumflex artery with Rx cypher stent. • TONSILLECTOMY         PREBIOTIC PRODUCT OR, Take 1 tablet by mouth daily. , Disp: , Rfl: , 9/8/2021 at 0800  Zinc 50 MG Oral Tab, TAKE ONE TABLET BY MOUTH ONCE DAILY, Disp: , Rfl: , 9/8/2021 at 080 Calcium 10 MG Oral Tab, Take 1 tablet (10 mg total) by mouth once daily. , Disp: 90 tablet, Rfl: 1, 9/8/2021 at 0800      lactated ringers infusion, , Intravenous, Continuous, Natasha Chandler MD  metoprolol tartrate (LOPRESSOR) tab 25 mg, 25 mg, Oral, O of Food in the Last Year:       Ran Out of Food in the Last Year:   Transportation Needs:       Lack of Transportation (Medical):       Lack of Transportation (Non-Medical):   Physical Activity:       Days of Exercise per Week:       Minutes of Exercise pe ECG reviewed    Neuro/Psych      GI/Hepatic/Renal    (+) GERD,     Endo/Other    Abdominal   (+) obese,                Anesthesia Plan:   ASA:  3  Plan:   General  Airway:  LMA  Post-op Pain Management: IV analgesics and Oral pain medication  Informed

## 2022-03-17 PROBLEM — Z71.9 ENCOUNTER FOR EDUCATION: Status: ACTIVE | Noted: 2022-03-17

## 2022-04-01 PROBLEM — E78.2 MIXED HYPERLIPIDEMIA: Status: ACTIVE | Noted: 2022-04-01

## 2022-04-01 PROBLEM — E11.9 DIABETES MELLITUS TYPE II, NON INSULIN DEPENDENT (HCC): Status: ACTIVE | Noted: 2022-04-01

## 2022-07-22 ENCOUNTER — OFFICE VISIT (OUTPATIENT)
Dept: SURGERY | Facility: CLINIC | Age: 62
End: 2022-07-22
Payer: MEDICARE

## 2022-07-22 DIAGNOSIS — C50.512 MALIGNANT NEOPLASM OF LOWER-OUTER QUADRANT OF LEFT BREAST OF FEMALE, ESTROGEN RECEPTOR POSITIVE (HCC): Primary | ICD-10-CM

## 2022-07-22 DIAGNOSIS — Z17.0 MALIGNANT NEOPLASM OF LOWER-OUTER QUADRANT OF LEFT BREAST OF FEMALE, ESTROGEN RECEPTOR POSITIVE (HCC): Primary | ICD-10-CM

## 2022-07-22 DIAGNOSIS — Z90.13 ABSENCE OF BREAST, ACQUIRED, BILATERAL: ICD-10-CM

## 2022-07-22 PROCEDURE — 99212 OFFICE O/P EST SF 10 MIN: CPT | Performed by: SURGERY

## 2022-07-22 NOTE — PROGRESS NOTES
Qasim Jose is a 57year old female who presents today for a follow-up. Since her last visit, the patient underwent bilateral skin sparing mastectomy. She completed left chest wall radiation approximately 3 weeks ago. She complains of left upper extremity swelling. She has lost approximately 30 pounds with lifestyle and diet modification. She is interested in delayed flap reconstruction. Physical Examination:  Breasts: Absence of bilateral breast is noted with well-healed transverse scars. The left breast is noted to have mild hyperpigmentation consistent with radiation change. Abdomen: Large abdominal pannus with striations of abdominal skin is noted. There are no palpable hernias noted. Assessment and Plan:  The patient was encouraged to continue her weight loss endeavors. We discussed proceeding with delayed flap patient instruction at least 6 months after the completion of radiation therapy. The patient was counseled that she should ideally be at her ideal goal weight prior to proceeding. We will plan for a CT angiogram of the abdominal wall. In the interim, she was also seen Dr. Nataliia Land to discuss possible lymphatic surgery. The patient return for reevaluation in approximately 3 months. The plan was reviewed with the patient and  and questions were answered.

## 2022-08-19 ENCOUNTER — OFFICE VISIT (OUTPATIENT)
Dept: SURGERY | Facility: CLINIC | Age: 62
End: 2022-08-19
Payer: MEDICARE

## 2022-08-19 VITALS — WEIGHT: 255 LBS | HEIGHT: 66.14 IN | BODY MASS INDEX: 40.98 KG/M2

## 2022-08-19 DIAGNOSIS — Z90.13 ABSENCE OF BOTH BREASTS: Primary | ICD-10-CM

## 2022-08-19 PROCEDURE — 99215 OFFICE O/P EST HI 40 MIN: CPT | Performed by: SURGERY

## 2022-08-19 PROCEDURE — 3008F BODY MASS INDEX DOCD: CPT | Performed by: SURGERY

## 2022-08-29 ENCOUNTER — TELEPHONE (OUTPATIENT)
Dept: SURGERY | Facility: CLINIC | Age: 62
End: 2022-08-29

## 2022-09-21 ENCOUNTER — HOSPITAL ENCOUNTER (OUTPATIENT)
Dept: CT IMAGING | Age: 62
Discharge: HOME | End: 2022-09-21
Attending: SURGERY

## 2022-09-21 DIAGNOSIS — Z17.0 MALIGNANT NEOPLASM OF LOWER-OUTER QUADRANT OF LEFT BREAST OF FEMALE, ESTROGEN RECEPTOR POSITIVE (HCC): ICD-10-CM

## 2022-09-21 DIAGNOSIS — C50.512 MALIGNANT NEOPLASM OF LOWER-OUTER QUADRANT OF LEFT BREAST OF FEMALE, ESTROGEN RECEPTOR POSITIVE (HCC): ICD-10-CM

## 2022-09-21 PROCEDURE — 82565 ASSAY OF CREATININE: CPT

## 2022-09-21 PROCEDURE — 74174 CTA ABD&PLVS W/CONTRAST: CPT | Performed by: SURGERY

## 2022-09-23 LAB
CREAT BLD-MCNC: 0.6 MG/DL
GFR SERPLBLD BASED ON 1.73 SQ M-ARVRAT: 100 ML/MIN/1.73M2 (ref 60–?)

## 2023-02-14 ENCOUNTER — OFFICE VISIT (OUTPATIENT)
Dept: SURGERY | Facility: CLINIC | Age: 63
End: 2023-02-14
Payer: COMMERCIAL

## 2023-02-14 VITALS — HEIGHT: 66 IN | BODY MASS INDEX: 42.17 KG/M2 | WEIGHT: 262.38 LBS

## 2023-02-14 DIAGNOSIS — Z90.13 ABSENCE OF BOTH BREASTS: Primary | ICD-10-CM

## 2023-02-14 PROCEDURE — 3008F BODY MASS INDEX DOCD: CPT | Performed by: SURGERY

## 2023-02-14 PROCEDURE — 99212 OFFICE O/P EST SF 10 MIN: CPT | Performed by: SURGERY

## 2023-07-21 ENCOUNTER — TELEPHONE (OUTPATIENT)
Dept: SURGERY | Facility: CLINIC | Age: 63
End: 2023-07-21

## 2025-01-06 ENCOUNTER — TELEPHONE (OUTPATIENT)
Dept: SURGERY | Facility: CLINIC | Age: 65
End: 2025-01-06

## 2025-01-06 NOTE — TELEPHONE ENCOUNTER
Patient called the office requesting a follow up appointment with both Dr. Morin and Dr. Pugh.  Inquired about patient's weight loss. Patient reports her most recent weight was 238lbs. Explained to patient that she would need to lose more weight in order to safely have surgery, but we can get her scheduled for a follow-up appointment with Dr. Morin to do another weight check and discuss next steps.

## 2025-03-11 ENCOUNTER — OFFICE VISIT (OUTPATIENT)
Facility: CLINIC | Age: 65
End: 2025-03-11
Payer: MEDICARE

## 2025-03-11 VITALS — WEIGHT: 244.19 LBS | HEIGHT: 66.54 IN | BODY MASS INDEX: 38.78 KG/M2

## 2025-03-11 DIAGNOSIS — Z90.13 ABSENCE OF BOTH BREASTS: Primary | ICD-10-CM

## 2025-03-11 PROCEDURE — 99212 OFFICE O/P EST SF 10 MIN: CPT | Performed by: SURGERY

## 2025-03-11 PROCEDURE — 3008F BODY MASS INDEX DOCD: CPT | Performed by: SURGERY

## 2025-03-11 NOTE — PROGRESS NOTES
Paula Valentino is a 64 year old female who presents today in follow-up.  She continues to attempt weight loss.      Physical Examination:  Vitals:    03/11/25 1437   Weight: 110.8 kg (244 lb 3.2 oz)   Height: 1.69 m (5' 6.54\")     Breasts: Absence of bilateral breasts is noted with well-healed transverse scar.  Left breast is noted to have mild hyperpigmentation and fibrosis consistent with radiation change.  Dogears are noted at the lateral aspect of the abdominal scar.  Abdomen: A large lower abdominal pannus is noted.  No palpable hernias noted.    Assessment and Plan:  This need for weight loss prior to proceeding with autologous reconstruction, ideally to a BMI of less than 35..  The patient was encouraged to reach out to the weight loss clinic for guidance.  She will follow-up for reevaluation in 3 to 6 months.  The plan is reviewed with the patient and her  and questions were answered.

## (undated) DEVICE — SOL  .9 1000ML BTL

## (undated) DEVICE — OUTPATIENT: Brand: MEDLINE INDUSTRIES, INC.

## (undated) DEVICE — SUTURE SILK 2-0 SA65H

## (undated) DEVICE — 12 ML SYRINGE LUER-LOCK TIP: Brand: MONOJECT

## (undated) DEVICE — SUTURE SILK 2-0 FS

## (undated) DEVICE — SUTURE VLOC 90 3-0 23\" 0034

## (undated) DEVICE — DRAIN SILICONE FLAT 10MM

## (undated) DEVICE — MINOR GENERAL: Brand: MEDLINE INDUSTRIES, INC.

## (undated) DEVICE — ADHESIVE MASTISOL 2/3CC VL

## (undated) DEVICE — UNDYED BRAIDED (POLYGLACTIN 910), SYNTHETIC ABSORBABLE SUTURE: Brand: COATED VICRYL

## (undated) DEVICE — Device

## (undated) DEVICE — DRAPE SHEET TRANSVERSE LAP

## (undated) DEVICE — GAMMEX® PI HYBRID SIZE 7.5, STERILE POWDER-FREE SURGICAL GLOVE, POLYISOPRENE AND NEOPRENE BLEND: Brand: GAMMEX

## (undated) DEVICE — SUTURE CHROMIC GUT 2-0 SH

## (undated) DEVICE — 3M™ STERI-STRIP™ REINFORCED ADHESIVE SKIN CLOSURES, R1547, 1/2 IN X 4 IN (12 MM X 100 MM), 6 STRIPS/ENVELOPE: Brand: 3M™ STERI-STRIP™

## (undated) DEVICE — COVER PRB NEOGUARD 30X2.6CM US

## (undated) DEVICE — SUTURE VLOC 180 3-0 18\" #0024

## (undated) DEVICE — 3M™ IOBAN™ 2 ANTIMICROBIAL INCISE DRAPE 6650EZ: Brand: IOBAN™ 2

## (undated) DEVICE — SUTURE VICRYL 3-0 SH

## (undated) DEVICE — SUTURE VICRYL 2-0

## (undated) DEVICE — PACK SRG UNV 1 STRL LF

## (undated) DEVICE — INTENDED FOR TISSUE SEPARATION, AND OTHER PROCEDURES THAT REQUIRE A SHARP SURGICAL BLADE TO PUNCTURE OR CUT.: Brand: BARD-PARKER ® STAINLESS STEEL BLADES

## (undated) DEVICE — SUTURE VICRYL 3-0 J442H

## (undated) DEVICE — SUTURE VICRYL 2-0 CT-1

## (undated) DEVICE — DRAIN RESERVOIR RELIAVAC 100CC

## (undated) DEVICE — SPONGE LAP 18X18IN 7IN LOOP

## (undated) DEVICE — MAJOR GENERAL: Brand: MEDLINE INDUSTRIES, INC.

## (undated) DEVICE — SUTURE PROLENE 2-0 SH

## (undated) DEVICE — SUTURE CHROMIC GUT 3-0 SH

## (undated) DEVICE — BLADE 11 SHRP BP SS SRG STRL

## (undated) DEVICE — C-ARM: Brand: UNBRANDED

## (undated) DEVICE — CONTAINER SPEC STR 4OZ GRY LID

## (undated) NOTE — LETTER
[address] [place] [address]      Authorization for Surgical Operation and Procedure     Date:___________                                                                                                         Time:_______ transfusion and/or blood products.   The following are some, but not all, of the potential risks that can occur: fever and allergic reactions, hemolytic reactions, transmission of diseases such as Hepatitis, AIDS and Cytomegalovirus (CMV) and fluid overload person authorized to consent on my behalf). The surgeon or my attending physician will determine when the applicable recovery period ends for purposes of reinstating the DNAR order.   10. Patients having a sterilization procedure: I understand that if the p I have disclosed this and had a discussion with my patient.     _______________________________________________________________ _____________________________  [facility]  [facility])